# Patient Record
Sex: MALE | Race: OTHER | Employment: OTHER | URBAN - NONMETROPOLITAN AREA
[De-identification: names, ages, dates, MRNs, and addresses within clinical notes are randomized per-mention and may not be internally consistent; named-entity substitution may affect disease eponyms.]

---

## 2017-07-18 RX ORDER — LISINOPRIL AND HYDROCHLOROTHIAZIDE 25; 20 MG/1; MG/1
1 TABLET ORAL DAILY
COMMUNITY

## 2017-07-18 RX ORDER — ASENAPINE 5 MG/1
5 TABLET SUBLINGUAL 3 TIMES DAILY
COMMUNITY

## 2017-07-26 ENCOUNTER — OFFICE VISIT (OUTPATIENT)
Dept: PAIN MANAGEMENT | Age: 57
End: 2017-07-26
Payer: MEDICARE

## 2017-07-26 VITALS
HEIGHT: 69 IN | BODY MASS INDEX: 24.2 KG/M2 | HEART RATE: 68 BPM | RESPIRATION RATE: 14 BRPM | SYSTOLIC BLOOD PRESSURE: 118 MMHG | WEIGHT: 163.4 LBS | DIASTOLIC BLOOD PRESSURE: 70 MMHG

## 2017-07-26 DIAGNOSIS — M54.16 LUMBAR RADICULITIS: Primary | ICD-10-CM

## 2017-07-26 DIAGNOSIS — M54.41 ACUTE BILATERAL LOW BACK PAIN WITH RIGHT-SIDED SCIATICA: ICD-10-CM

## 2017-07-26 PROCEDURE — 99204 OFFICE O/P NEW MOD 45 MIN: CPT | Performed by: PHYSICAL MEDICINE & REHABILITATION

## 2017-07-26 RX ORDER — CYCLOBENZAPRINE HCL 10 MG
10 TABLET ORAL 3 TIMES DAILY PRN
COMMUNITY
End: 2018-02-07 | Stop reason: SDUPTHER

## 2017-07-26 RX ORDER — ASENAPINE 10 MG/1
10 TABLET SUBLINGUAL 2 TIMES DAILY
Status: ON HOLD | COMMUNITY
End: 2020-02-26

## 2017-07-26 ASSESSMENT — ENCOUNTER SYMPTOMS
RESPIRATORY NEGATIVE: 1
BACK PAIN: 1
NAUSEA: 0
ALLERGIC/IMMUNOLOGIC NEGATIVE: 1
EYES NEGATIVE: 1
CONSTIPATION: 0

## 2017-08-07 ENCOUNTER — TELEPHONE (OUTPATIENT)
Dept: PAIN MANAGEMENT | Age: 57
End: 2017-08-07

## 2017-09-06 ENCOUNTER — OFFICE VISIT (OUTPATIENT)
Dept: PAIN MANAGEMENT | Age: 57
End: 2017-09-06
Payer: MEDICARE

## 2017-09-06 VITALS
DIASTOLIC BLOOD PRESSURE: 84 MMHG | SYSTOLIC BLOOD PRESSURE: 135 MMHG | BODY MASS INDEX: 24.44 KG/M2 | HEIGHT: 69 IN | RESPIRATION RATE: 15 BRPM | WEIGHT: 165 LBS | HEART RATE: 65 BPM

## 2017-09-06 DIAGNOSIS — M54.16 LUMBAR RADICULITIS: Primary | ICD-10-CM

## 2017-09-06 DIAGNOSIS — M54.41 ACUTE BILATERAL LOW BACK PAIN WITH RIGHT-SIDED SCIATICA: ICD-10-CM

## 2017-09-06 LAB
AMPHETAMINE SCREEN, URINE: NEGATIVE
BARBITURATE SCREEN, URINE: NEGATIVE
BENZODIAZEPINE SCREEN, URINE: NEGATIVE
COCAINE METABOLITE SCREEN URINE: NEGATIVE
MDMA URINE: NORMAL
METHADONE SCREEN, URINE: NEGATIVE
METHAMPHETAMINE, URINE: NEGATIVE
OPIATE SCREEN URINE: NEGATIVE
OXYCODONE SCREEN URINE: NEGATIVE
PHENCYCLIDINE SCREEN URINE: NEGATIVE
PROPOXYPHENE SCREEN, URINE: NORMAL
THC: NEGATIVE
TRICYCLIC ANTIDEPRESSANTS, UR: NEGATIVE

## 2017-09-06 PROCEDURE — 80305 DRUG TEST PRSMV DIR OPT OBS: CPT | Performed by: PHYSICAL MEDICINE & REHABILITATION

## 2017-09-06 PROCEDURE — 99215 OFFICE O/P EST HI 40 MIN: CPT | Performed by: PHYSICAL MEDICINE & REHABILITATION

## 2017-09-06 ASSESSMENT — ENCOUNTER SYMPTOMS
ALLERGIC/IMMUNOLOGIC NEGATIVE: 1
CONSTIPATION: 0
NAUSEA: 0
EYES NEGATIVE: 1
RESPIRATORY NEGATIVE: 1
BACK PAIN: 1

## 2018-01-17 ENCOUNTER — PROCEDURE VISIT (OUTPATIENT)
Dept: PAIN MANAGEMENT | Age: 58
End: 2018-01-17
Payer: MEDICARE

## 2018-01-17 DIAGNOSIS — G89.29 CHRONIC BILATERAL LOW BACK PAIN WITH BILATERAL SCIATICA: ICD-10-CM

## 2018-01-17 DIAGNOSIS — M54.41 CHRONIC BILATERAL LOW BACK PAIN WITH BILATERAL SCIATICA: ICD-10-CM

## 2018-01-17 DIAGNOSIS — M54.42 CHRONIC BILATERAL LOW BACK PAIN WITH BILATERAL SCIATICA: ICD-10-CM

## 2018-01-17 DIAGNOSIS — M54.16 LUMBAR RADICULOPATHY: Primary | ICD-10-CM

## 2018-01-17 PROCEDURE — 64483 NJX AA&/STRD TFRM EPI L/S 1: CPT | Performed by: PHYSICAL MEDICINE & REHABILITATION

## 2018-01-17 NOTE — PROGRESS NOTES
TRANSFORAMINAL EPIDURAL STEROID INJECTION    1/17/18    Surgeon: Claudio West MD    Pre-operative Diagnosis: There is no problem list on file for this patient. Post-operative Diagnosis: Same    Assistants: none    This is a 61 y.o. male patient with pain in the Back, left leg, right leg.  Previous treatment and examination findings are noted in the H&P. BL5 transforaminal epidural injection has been requested for diagnostic and therapeutic reasons. Conservative treatment was ineffective i.e.: ice, NSAIDS, rest, narcotic medication, chiropractic care, physical therapy and message therapy. Patient is unable to perform the following ADL's: ambulating and grooming                         Last Plain films: 9/6/17      EXAMINATION:  1. BL5 transforaminal radiculogram/epidurogram.   2. BL5 transforaminal epidural anesthetic injection. 3. BL5 transforaminal epidural steroid injection. CONSENT: Written consent was obtained from the patient on preprinted consent form after explaining the procedure, indications, potential complications and outcomes. Alternative treatments were also discussed. DISCUSSION: The patient was sterilely prepped and draped in the usual fashion in the prone position. Time out was verified for correct patient, side, level and procedure. SEDATION:   No conscious sedation was performed during the procedure.  The patient remained awake and conversed throughout the procedure.  The patient underwent pulse oximetry and blood pressure monitoring independently by a trained observer, as well as by a physician. PROCEDURE:  Under image-intensifier control, a 22 gauge needle x 5 inch spinal needle was guided successfully into the epidural space employing a posterior lateral/oblique approach. Needle aspiration was negative for heme or CSF. Instillation of  .5 mL of Omnipaque 240 contrast medium opacified the spinal nerve and demonstrated contiguous flow into the epidural space.  No vascular spread was noted. Digital subtraction was not employed to evaluate for vascular spread. The patient was monitored for any untoward reaction to contrast medium before proceeding with procedure #2. The patient did not report pain reproduction in a concordant distribution. Following needle position verification, a test dose of .5 mL of sterile saline was administered and patient monitored for any adverse effects. Then, 1 mL of Betamethasone (Celestone 6 mg/mL) and Methylprednisolone (Depo-Medrol 80 mg/mL)   was instilled into the epidural space and the patient's response was again monitored. Finally,  1 ml of 0.5% bupivacaine was then instilled. The patient's response was again monitored. The spinal needle was removed. The patient tolerated the procedure well and without complications and was noted to be in stable condition prior to discharge from the procedure center with discharge instructions. EBL: no blood loss    SPECIMEN: none    IMPRESSIONS:  1. BL5 transforaminal epidurogram, epidural anesthesia and epidural steroid injection procedures accomplished without incident. RECOMMENDATIONS:  1. Complete and return Post-Procedure Pain and Activity Diary.   2. Contact the [address] for symptom exacerbation, fever or unusual symptoms. 3. Post-procedure care according to verbal and written discharge instructions    POST-PROCEDURE EPIDUROGRAPHY INTERPRETATION:    EXAMINATION: AP, lateral, and oblique views    FLUORO TIME: 25 seconds    DISCUSSION: Spot views of the spine reveal normal alignment and segmentation. Spinal needle is positioned at the BL5 neuroforamin. Contrast spreads and outlines the BL5 nerve/ neuroforamin and epidural space. The epidurogram reveals excellent contrast flow. Visualized spine reveals See radiology report. Soft tissues reveal no abnormalities.     IMPRESSION: BL5 transforaminal epidurogram/epineurogram reveals satisfactory needle position and contrast spread.      Electronically signed by Nahed Skaggs MD on 1/17/2018 at 1:27 PM

## 2018-02-07 ENCOUNTER — OFFICE VISIT (OUTPATIENT)
Dept: PAIN MANAGEMENT | Age: 58
End: 2018-02-07
Payer: MEDICARE

## 2018-02-07 VITALS
SYSTOLIC BLOOD PRESSURE: 154 MMHG | DIASTOLIC BLOOD PRESSURE: 93 MMHG | HEIGHT: 69 IN | WEIGHT: 175 LBS | BODY MASS INDEX: 25.92 KG/M2 | HEART RATE: 76 BPM

## 2018-02-07 DIAGNOSIS — M54.16 LUMBAR RADICULOPATHY: Primary | ICD-10-CM

## 2018-02-07 DIAGNOSIS — M47.816 LUMBAR FACET JOINT SYNDROME: ICD-10-CM

## 2018-02-07 PROCEDURE — G8419 CALC BMI OUT NRM PARAM NOF/U: HCPCS | Performed by: PHYSICAL MEDICINE & REHABILITATION

## 2018-02-07 PROCEDURE — 3017F COLORECTAL CA SCREEN DOC REV: CPT | Performed by: PHYSICAL MEDICINE & REHABILITATION

## 2018-02-07 PROCEDURE — 4004F PT TOBACCO SCREEN RCVD TLK: CPT | Performed by: PHYSICAL MEDICINE & REHABILITATION

## 2018-02-07 PROCEDURE — 99214 OFFICE O/P EST MOD 30 MIN: CPT | Performed by: PHYSICAL MEDICINE & REHABILITATION

## 2018-02-07 PROCEDURE — G8427 DOCREV CUR MEDS BY ELIG CLIN: HCPCS | Performed by: PHYSICAL MEDICINE & REHABILITATION

## 2018-02-07 PROCEDURE — G8484 FLU IMMUNIZE NO ADMIN: HCPCS | Performed by: PHYSICAL MEDICINE & REHABILITATION

## 2018-02-07 RX ORDER — CYCLOBENZAPRINE HCL 10 MG
10 TABLET ORAL 3 TIMES DAILY PRN
Qty: 90 TABLET | Refills: 1 | Status: SHIPPED | OUTPATIENT
Start: 2018-02-07 | End: 2018-03-09

## 2018-02-07 NOTE — PROGRESS NOTES
PAIN MANAGEMENT FOLLOW-UP NOTE  2/7/18    CHIEF COMPLAINT: This is a 61 y.o. male patient who returns to the [facility] Management [facility] with a history of Back Pain (follow up post injection)      PAIN HPI: Bayron Huber returns today for  reevaluation. Since the visit, the patient reports that the pain is not changed. For about 2 days had  Less back pain post TFE  Now still  Is more painful than  Legs, legs are entirely sharp pain and numbness  ANALGESIA:   Are your Current Pain medication (s) helping to decrease pain? Yes. Current Pain score: Pain Score:   7    ADVERSE AFFECTS:   Medication Side Effects: No.    ACTIVITY:  Are you able to be more active with your pain medications? No      ABERRANT BEHAVIORS SINCE LAST VISIT? No    Review of Systems     Allergies   Allergen Reactions    Asa [Aspirin] Itching and Swelling    Codeine Itching and Swelling       Outpatient Medications Prior to Visit   Medication Sig Dispense Refill    asenapine maleate (SAPHRIS) 10 MG SUBL sublingual tablet Place 10 mg under the tongue 2 times daily      lisinopril-hydrochlorothiazide (PRINZIDE;ZESTORETIC) 20-25 MG per tablet Take 1 tablet by mouth daily      asenapine maleate (SAPHRIS) 5 MG SUBL sublingual tablet Place 5 mg under the tongue daily      Mirtazapine (REMERON PO) Take 45 mg by mouth nightly       cyclobenzaprine (FLEXERIL) 10 MG tablet Take 10 mg by mouth 3 times daily as needed for Muscle spasms      ARIPiprazole (ABILIFY) 15 MG tablet Take 7.5 mg by mouth daily. No facility-administered medications prior to visit. Past Medical History:   Diagnosis Date    Hypertension     Schizoaffective disorder, bipolar type [facility])        Past Surgical History:   Procedure Laterality Date    EYE SURGERY Right 2/27/2014    VITRECTOMY Right 03/03/2014    with antibiotic injection     History reviewed. No pertinent family history. Social History     Social History    Marital status:       Spouse name: N/A    Number of children: N/A    Years of education: N/A     Social History Main Topics    Smoking status: Current Every Day Smoker     Packs/day: 1.00     Years: 40.00    Smokeless tobacco: None    Alcohol use No    Drug use: No    Sexual activity: Not Asked     Other Topics Concern    None     Social History Narrative    None         Family and Social History reviewed in the electronic medical record. Imaging: Reviewed available imaging in our system with the patient. No results found. Objective:     Physical Exam:  Vitals:    02/07/18 1225   BP: (!) 154/93   Site: Right Arm   Position: Sitting   Cuff Size: Medium Adult   Pulse: 76   Weight: 175 lb (79.4 kg)   Height: 5' 9\" (1.753 m)     Pain Score:   7    Physical Exam  Back Exam     Range of Motion   Flexion:                 Extension:                 Lateral Bend Left:    Abnormal  Lateral Bend Right:  Abnormal  Rotation Right:          Rotation Left:            SLR   Right:  Left:    Positive    Comments:  +Lisy B +Princess                                     Assessment: This is a 61 y.o. male patient with:    Diagnosis:   1. Lumbar radiculopathy     2. Lumbar facet joint syndrome         Medical Comorbidities:  As listed in the patient's past medical and surgical history    Functional Limitations:   Pain limits function and quality of life. Plan:   #2  Of B L5 TFE  Meds:   Controlled Substances Monitoring: Pt educated about the risks of taking opiates, including increased sedation, constipation, slowed breathing, tolerance, dependence, and addiction. New Prescriptions    No medications on file      No orders of the defined types were placed in this encounter. No orders of the defined types were placed in this encounter. No Follow-up on file. The patient expressed understanding of the above assessment and plan.     Total time spent face to face with patient was 20 minutes in which  50% or more of the time was spent in

## 2018-02-27 PROBLEM — G89.29 CHRONIC BILATERAL LOW BACK PAIN WITH BILATERAL SCIATICA: Status: ACTIVE | Noted: 2018-02-27

## 2018-02-27 PROBLEM — M54.41 CHRONIC BILATERAL LOW BACK PAIN WITH BILATERAL SCIATICA: Status: ACTIVE | Noted: 2018-02-27

## 2018-02-27 PROBLEM — M54.42 CHRONIC BILATERAL LOW BACK PAIN WITH BILATERAL SCIATICA: Status: ACTIVE | Noted: 2018-02-27

## 2018-02-27 PROBLEM — M54.16 LUMBAR RADICULOPATHY: Status: ACTIVE | Noted: 2018-02-27

## 2018-02-27 NOTE — PROGRESS NOTES
Progress Notes  Encounter Date: 1/17/2018  Sp Ramirez MD   Physical Medicine and Rehab      []Hide copied text  []Hover for attribution information  TRANSFORAMINAL EPIDURAL STEROID INJECTION     1/17/18     Surgeon: Sp Ramirez MD     Pre-operative Diagnosis: lumbar radiculopathy                                            Chronic lower back pain        Post-operative Diagnosis: Same     Assistants: none     This is a 61 y.o. male patient with pain in the Back, left leg, right leg.  Previous treatment and examination findings are noted in the H&P. BL5 transforaminal epidural injection has been requested for diagnostic and therapeutic reasons.     Conservative treatment was ineffective i.e.: ice, NSAIDS, rest, narcotic medication, chiropractic care, physical therapy and message therapy.     Patient is unable to perform the following ADL's: ambulating and grooming        Last Plain films: 9/6/17        EXAMINATION:  1. BL5 transforaminal radiculogram/epidurogram.   2. BL5 transforaminal epidural anesthetic injection. 3. BL5 transforaminal epidural steroid injection.     CONSENT: Written consent was obtained from the patient on preprinted consent form after explaining the procedure, indications, potential complications and outcomes. Alternative treatments were also discussed.     DISCUSSION: The patient was sterilely prepped and draped in the usual fashion in the prone position. Time out was verified for correct patient, side, level and procedure.     SEDATION:   No conscious sedation was performed during the procedure.  The patient remained awake and conversed throughout the procedure.  The patient underwent pulse oximetry and blood pressure monitoring independently by a trained observer, as well as by a physician.     PROCEDURE:  Under image-intensifier control, a 22 gauge needle x 5 inch spinal needle was guided successfully into the epidural space employing a posterior lateral/oblique approach.  Needle

## 2018-05-01 RX ORDER — CYCLOBENZAPRINE HCL 10 MG
10 TABLET ORAL 3 TIMES DAILY
Qty: 90 TABLET | Refills: 0 | Status: SHIPPED | OUTPATIENT
Start: 2018-05-01 | End: 2018-05-31

## 2018-05-01 RX ORDER — CYCLOBENZAPRINE HCL 10 MG
10 TABLET ORAL 3 TIMES DAILY PRN
COMMUNITY
Start: 2018-04-11 | End: 2018-05-01 | Stop reason: SDUPTHER

## 2018-05-02 ENCOUNTER — OFFICE VISIT (OUTPATIENT)
Dept: PAIN MANAGEMENT | Age: 58
End: 2018-05-02
Payer: MEDICARE

## 2018-05-02 VITALS
HEART RATE: 82 BPM | DIASTOLIC BLOOD PRESSURE: 82 MMHG | SYSTOLIC BLOOD PRESSURE: 120 MMHG | WEIGHT: 175.04 LBS | OXYGEN SATURATION: 95 % | HEIGHT: 69 IN | BODY MASS INDEX: 25.93 KG/M2 | RESPIRATION RATE: 16 BRPM

## 2018-05-02 DIAGNOSIS — M54.41 CHRONIC BILATERAL LOW BACK PAIN WITH BILATERAL SCIATICA: ICD-10-CM

## 2018-05-02 DIAGNOSIS — G89.29 CHRONIC BILATERAL LOW BACK PAIN WITH BILATERAL SCIATICA: ICD-10-CM

## 2018-05-02 DIAGNOSIS — M54.42 CHRONIC BILATERAL LOW BACK PAIN WITH BILATERAL SCIATICA: ICD-10-CM

## 2018-05-02 DIAGNOSIS — M54.16 LUMBAR RADICULOPATHY: Primary | ICD-10-CM

## 2018-05-02 DIAGNOSIS — M12.811 ROTATOR CUFF ARTHROPATHY, RIGHT: ICD-10-CM

## 2018-05-02 DIAGNOSIS — M54.2 CERVICALGIA: ICD-10-CM

## 2018-05-02 PROCEDURE — G8427 DOCREV CUR MEDS BY ELIG CLIN: HCPCS | Performed by: PHYSICAL MEDICINE & REHABILITATION

## 2018-05-02 PROCEDURE — 3017F COLORECTAL CA SCREEN DOC REV: CPT | Performed by: PHYSICAL MEDICINE & REHABILITATION

## 2018-05-02 PROCEDURE — 4004F PT TOBACCO SCREEN RCVD TLK: CPT | Performed by: PHYSICAL MEDICINE & REHABILITATION

## 2018-05-02 PROCEDURE — G8419 CALC BMI OUT NRM PARAM NOF/U: HCPCS | Performed by: PHYSICAL MEDICINE & REHABILITATION

## 2018-05-02 PROCEDURE — 99214 OFFICE O/P EST MOD 30 MIN: CPT | Performed by: PHYSICAL MEDICINE & REHABILITATION

## 2018-05-02 RX ORDER — ATORVASTATIN CALCIUM 20 MG/1
20 TABLET, FILM COATED ORAL DAILY
COMMUNITY

## 2018-06-13 ENCOUNTER — OFFICE VISIT (OUTPATIENT)
Dept: PAIN MANAGEMENT | Age: 58
End: 2018-06-13
Payer: MEDICARE

## 2018-06-13 VITALS
DIASTOLIC BLOOD PRESSURE: 78 MMHG | RESPIRATION RATE: 16 BRPM | OXYGEN SATURATION: 98 % | WEIGHT: 175.04 LBS | BODY MASS INDEX: 24.51 KG/M2 | SYSTOLIC BLOOD PRESSURE: 122 MMHG | HEIGHT: 71 IN | HEART RATE: 69 BPM

## 2018-06-13 DIAGNOSIS — G89.29 CHRONIC BILATERAL LOW BACK PAIN WITH BILATERAL SCIATICA: Primary | ICD-10-CM

## 2018-06-13 DIAGNOSIS — M54.16 LUMBAR RADICULOPATHY: ICD-10-CM

## 2018-06-13 DIAGNOSIS — M54.42 CHRONIC BILATERAL LOW BACK PAIN WITH BILATERAL SCIATICA: Primary | ICD-10-CM

## 2018-06-13 DIAGNOSIS — M54.41 CHRONIC BILATERAL LOW BACK PAIN WITH BILATERAL SCIATICA: Primary | ICD-10-CM

## 2018-06-13 DIAGNOSIS — M47.816 LUMBAR FACET ARTHROPATHY: ICD-10-CM

## 2018-06-13 PROCEDURE — 4004F PT TOBACCO SCREEN RCVD TLK: CPT | Performed by: PHYSICAL MEDICINE & REHABILITATION

## 2018-06-13 PROCEDURE — G8420 CALC BMI NORM PARAMETERS: HCPCS | Performed by: PHYSICAL MEDICINE & REHABILITATION

## 2018-06-13 PROCEDURE — 99214 OFFICE O/P EST MOD 30 MIN: CPT | Performed by: PHYSICAL MEDICINE & REHABILITATION

## 2018-06-13 PROCEDURE — 3017F COLORECTAL CA SCREEN DOC REV: CPT | Performed by: PHYSICAL MEDICINE & REHABILITATION

## 2018-06-13 PROCEDURE — G8427 DOCREV CUR MEDS BY ELIG CLIN: HCPCS | Performed by: PHYSICAL MEDICINE & REHABILITATION

## 2018-06-26 RX ORDER — CYCLOBENZAPRINE HCL 10 MG
TABLET ORAL
Qty: 90 TABLET | Refills: 0 | Status: SHIPPED | OUTPATIENT
Start: 2018-06-26 | End: 2018-06-27 | Stop reason: SDUPTHER

## 2018-06-29 RX ORDER — CYCLOBENZAPRINE HCL 10 MG
TABLET ORAL
Qty: 90 TABLET | Refills: 0 | Status: SHIPPED | OUTPATIENT
Start: 2018-06-29 | End: 2018-08-01 | Stop reason: SDUPTHER

## 2018-08-01 RX ORDER — CYCLOBENZAPRINE HCL 10 MG
TABLET ORAL
Qty: 90 TABLET | Refills: 0 | Status: CANCELLED | OUTPATIENT
Start: 2018-08-01

## 2018-08-01 RX ORDER — CYCLOBENZAPRINE HCL 10 MG
TABLET ORAL
Qty: 90 TABLET | Refills: 0 | Status: SHIPPED | OUTPATIENT
Start: 2018-08-01 | End: 2018-08-20 | Stop reason: SDUPTHER

## 2018-08-24 RX ORDER — CYCLOBENZAPRINE HCL 10 MG
10 TABLET ORAL 3 TIMES DAILY PRN
Qty: 90 TABLET | Refills: 11 | Status: ON HOLD | OUTPATIENT
Start: 2018-08-24 | End: 2020-02-26

## 2019-09-24 ENCOUNTER — HOSPITAL ENCOUNTER (OUTPATIENT)
Age: 59
Setting detail: SPECIMEN
Discharge: HOME | End: 2019-09-24
Payer: MEDICARE

## 2019-09-25 LAB
C. TRACHOMATIS DNA ,URINE: NEGATIVE
N. GONORRHOEAE DNA, URINE: NEGATIVE
SOURCE: NORMAL
SPECIMEN DESCRIPTION: NORMAL
TRICHOMONAS VAGINALI, MOLECULAR: NEGATIVE

## 2019-09-26 LAB
CULTURE: NORMAL
Lab: NORMAL
SPECIMEN DESCRIPTION: NORMAL

## 2020-02-26 ENCOUNTER — HOSPITAL ENCOUNTER (INPATIENT)
Age: 60
LOS: 4 days | Discharge: HOME | DRG: 641 | End: 2020-03-01
Attending: FAMILY MEDICINE | Admitting: INTERNAL MEDICINE
Payer: MEDICARE

## 2020-02-26 PROBLEM — R21 GENERALIZED RASH: Status: ACTIVE | Noted: 2020-02-26

## 2020-02-26 PROCEDURE — 99222 1ST HOSP IP/OBS MODERATE 55: CPT | Performed by: NURSE PRACTITIONER

## 2020-02-26 PROCEDURE — 6370000000 HC RX 637 (ALT 250 FOR IP): Performed by: PHYSICIAN ASSISTANT

## 2020-02-26 PROCEDURE — 2580000003 HC RX 258: Performed by: PHYSICIAN ASSISTANT

## 2020-02-26 PROCEDURE — 6370000000 HC RX 637 (ALT 250 FOR IP): Performed by: NURSE PRACTITIONER

## 2020-02-26 PROCEDURE — 2060000000 HC ICU INTERMEDIATE R&B

## 2020-02-26 RX ORDER — ACETAMINOPHEN 325 MG/1
650 TABLET ORAL EVERY 6 HOURS PRN
Status: DISCONTINUED | OUTPATIENT
Start: 2020-02-26 | End: 2020-03-01 | Stop reason: HOSPADM

## 2020-02-26 RX ORDER — DIPHENHYDRAMINE HCL 25 MG
25 TABLET ORAL EVERY 6 HOURS PRN
Status: DISCONTINUED | OUTPATIENT
Start: 2020-02-26 | End: 2020-03-01 | Stop reason: HOSPADM

## 2020-02-26 RX ORDER — SODIUM CHLORIDE 0.9 % (FLUSH) 0.9 %
10 SYRINGE (ML) INJECTION PRN
Status: DISCONTINUED | OUTPATIENT
Start: 2020-02-26 | End: 2020-03-01 | Stop reason: HOSPADM

## 2020-02-26 RX ORDER — ASENAPINE 5 MG/1
5 TABLET SUBLINGUAL 2 TIMES DAILY
Status: DISCONTINUED | OUTPATIENT
Start: 2020-02-26 | End: 2020-03-01 | Stop reason: HOSPADM

## 2020-02-26 RX ORDER — LISINOPRIL AND HYDROCHLOROTHIAZIDE 25; 20 MG/1; MG/1
1 TABLET ORAL DAILY
Status: DISCONTINUED | OUTPATIENT
Start: 2020-02-27 | End: 2020-03-01 | Stop reason: HOSPADM

## 2020-02-26 RX ORDER — POLYETHYLENE GLYCOL 3350 17 G/17G
17 POWDER, FOR SOLUTION ORAL DAILY PRN
Status: DISCONTINUED | OUTPATIENT
Start: 2020-02-26 | End: 2020-03-01 | Stop reason: HOSPADM

## 2020-02-26 RX ORDER — NICOTINE 21 MG/24HR
1 PATCH, TRANSDERMAL 24 HOURS TRANSDERMAL DAILY PRN
Status: DISCONTINUED | OUTPATIENT
Start: 2020-02-26 | End: 2020-03-01 | Stop reason: HOSPADM

## 2020-02-26 RX ORDER — ASENAPINE 5 MG/1
10 TABLET SUBLINGUAL 2 TIMES DAILY
Status: DISCONTINUED | OUTPATIENT
Start: 2020-02-26 | End: 2020-02-26

## 2020-02-26 RX ORDER — ONDANSETRON 2 MG/ML
4 INJECTION INTRAMUSCULAR; INTRAVENOUS EVERY 6 HOURS PRN
Status: DISCONTINUED | OUTPATIENT
Start: 2020-02-26 | End: 2020-03-01 | Stop reason: HOSPADM

## 2020-02-26 RX ORDER — SODIUM CHLORIDE 9 MG/ML
INJECTION, SOLUTION INTRAVENOUS CONTINUOUS
Status: DISCONTINUED | OUTPATIENT
Start: 2020-02-26 | End: 2020-03-01 | Stop reason: HOSPADM

## 2020-02-26 RX ORDER — SODIUM CHLORIDE 0.9 % (FLUSH) 0.9 %
10 SYRINGE (ML) INJECTION EVERY 12 HOURS SCHEDULED
Status: DISCONTINUED | OUTPATIENT
Start: 2020-02-26 | End: 2020-03-01 | Stop reason: HOSPADM

## 2020-02-26 RX ORDER — ATORVASTATIN CALCIUM 20 MG/1
20 TABLET, FILM COATED ORAL DAILY
Status: DISCONTINUED | OUTPATIENT
Start: 2020-02-27 | End: 2020-03-01 | Stop reason: HOSPADM

## 2020-02-26 RX ORDER — ARIPIPRAZOLE 15 MG/1
7.5 TABLET ORAL DAILY
Status: DISCONTINUED | OUTPATIENT
Start: 2020-02-27 | End: 2020-02-27

## 2020-02-26 RX ORDER — MAGNESIUM SULFATE 1 G/100ML
1 INJECTION INTRAVENOUS PRN
Status: DISCONTINUED | OUTPATIENT
Start: 2020-02-26 | End: 2020-03-01 | Stop reason: HOSPADM

## 2020-02-26 RX ORDER — METHYLPREDNISOLONE SODIUM SUCCINATE 125 MG/2ML
60 INJECTION, POWDER, LYOPHILIZED, FOR SOLUTION INTRAMUSCULAR; INTRAVENOUS DAILY
Status: DISCONTINUED | OUTPATIENT
Start: 2020-02-27 | End: 2020-02-29

## 2020-02-26 RX ORDER — POTASSIUM CHLORIDE 7.45 MG/ML
10 INJECTION INTRAVENOUS PRN
Status: DISCONTINUED | OUTPATIENT
Start: 2020-02-26 | End: 2020-03-01 | Stop reason: HOSPADM

## 2020-02-26 RX ORDER — PROMETHAZINE HYDROCHLORIDE 25 MG/1
12.5 TABLET ORAL EVERY 6 HOURS PRN
Status: DISCONTINUED | OUTPATIENT
Start: 2020-02-26 | End: 2020-03-01 | Stop reason: HOSPADM

## 2020-02-26 RX ORDER — CYCLOBENZAPRINE HCL 10 MG
10 TABLET ORAL 3 TIMES DAILY PRN
Status: DISCONTINUED | OUTPATIENT
Start: 2020-02-26 | End: 2020-02-27

## 2020-02-26 RX ORDER — ACETAMINOPHEN 650 MG/1
650 SUPPOSITORY RECTAL EVERY 6 HOURS PRN
Status: DISCONTINUED | OUTPATIENT
Start: 2020-02-26 | End: 2020-03-01 | Stop reason: HOSPADM

## 2020-02-26 RX ORDER — POTASSIUM CHLORIDE 20 MEQ/1
40 TABLET, EXTENDED RELEASE ORAL PRN
Status: DISCONTINUED | OUTPATIENT
Start: 2020-02-26 | End: 2020-03-01 | Stop reason: HOSPADM

## 2020-02-26 RX ADMIN — MIRTAZAPINE 45 MG: 15 TABLET, FILM COATED ORAL at 22:45

## 2020-02-26 RX ADMIN — ASENAPINE MALEATE 5 MG: 5 TABLET SUBLINGUAL at 22:45

## 2020-02-26 RX ADMIN — SODIUM CHLORIDE, PRESERVATIVE FREE 10 ML: 5 INJECTION INTRAVENOUS at 22:17

## 2020-02-26 RX ADMIN — SODIUM CHLORIDE: 9 INJECTION, SOLUTION INTRAVENOUS at 23:18

## 2020-02-26 ASSESSMENT — PAIN DESCRIPTION - ORIENTATION: ORIENTATION: MID;RIGHT;LEFT

## 2020-02-26 ASSESSMENT — PAIN DESCRIPTION - ONSET: ONSET: ON-GOING

## 2020-02-26 ASSESSMENT — PAIN DESCRIPTION - PROGRESSION: CLINICAL_PROGRESSION: NOT CHANGED

## 2020-02-26 ASSESSMENT — PAIN DESCRIPTION - FREQUENCY: FREQUENCY: CONTINUOUS

## 2020-02-26 ASSESSMENT — PAIN - FUNCTIONAL ASSESSMENT: PAIN_FUNCTIONAL_ASSESSMENT: PREVENTS OR INTERFERES SOME ACTIVE ACTIVITIES AND ADLS

## 2020-02-26 ASSESSMENT — PAIN DESCRIPTION - DESCRIPTORS: DESCRIPTORS: ACHING;BURNING;CONSTANT

## 2020-02-26 ASSESSMENT — PAIN SCALES - GENERAL: PAINLEVEL_OUTOF10: 6

## 2020-02-26 ASSESSMENT — PAIN DESCRIPTION - LOCATION: LOCATION: ARM;BACK

## 2020-02-26 ASSESSMENT — PAIN DESCRIPTION - PAIN TYPE: TYPE: ACUTE PAIN;CHRONIC PAIN

## 2020-02-26 ASSESSMENT — PAIN DESCRIPTION - DIRECTION: RADIATING_TOWARDS: LEGS

## 2020-02-27 PROBLEM — E52: Status: ACTIVE | Noted: 2020-02-27

## 2020-02-27 PROBLEM — L51.3 SJS-TEN OVERLAP SYNDROME (HCC): Status: ACTIVE | Noted: 2020-02-26

## 2020-02-27 PROBLEM — L56.8 PHOTOSENSITIVITY DERMATITIS: Status: ACTIVE | Noted: 2020-02-27

## 2020-02-27 LAB
ABSOLUTE EOS #: 0 K/UL (ref 0–0.4)
ABSOLUTE IMMATURE GRANULOCYTE: 0 K/UL (ref 0–0.3)
ABSOLUTE LYMPH #: 0.31 K/UL (ref 1–4.8)
ABSOLUTE MONO #: 0.18 K/UL (ref 0.1–0.8)
ALBUMIN SERPL-MCNC: 4 G/DL (ref 3.5–5.2)
ALBUMIN/GLOBULIN RATIO: 1.4 (ref 1–2.5)
ALP BLD-CCNC: 91 U/L (ref 40–129)
ALT SERPL-CCNC: 16 U/L (ref 5–41)
ANION GAP SERPL CALCULATED.3IONS-SCNC: 14 MMOL/L (ref 9–17)
ANION GAP SERPL CALCULATED.3IONS-SCNC: 14 MMOL/L (ref 9–17)
ANTISTREPTOLYSIN-O: 52.5 IU/ML (ref 0–200)
AST SERPL-CCNC: 12 U/L
BASOPHILS # BLD: 0 % (ref 0–2)
BASOPHILS ABSOLUTE: 0 K/UL (ref 0–0.2)
BILIRUB SERPL-MCNC: 0.3 MG/DL (ref 0.3–1.2)
BILIRUBIN DIRECT: 0.1 MG/DL
BILIRUBIN URINE: NEGATIVE
BILIRUBIN, INDIRECT: 0.2 MG/DL (ref 0–1)
BUN BLDV-MCNC: 20 MG/DL (ref 8–23)
BUN BLDV-MCNC: 20 MG/DL (ref 8–23)
BUN/CREAT BLD: NORMAL (ref 9–20)
CALCIUM SERPL-MCNC: 8.9 MG/DL (ref 8.6–10.4)
CALCIUM SERPL-MCNC: 9.1 MG/DL (ref 8.6–10.4)
CHLORIDE BLD-SCNC: 102 MMOL/L (ref 98–107)
CHLORIDE BLD-SCNC: 99 MMOL/L (ref 98–107)
CO2: 20 MMOL/L (ref 20–31)
CO2: 22 MMOL/L (ref 20–31)
COLOR: YELLOW
COMMENT UA: ABNORMAL
CREAT SERPL-MCNC: 0.71 MG/DL (ref 0.7–1.2)
CREAT SERPL-MCNC: 0.73 MG/DL (ref 0.7–1.2)
DIFFERENTIAL TYPE: ABNORMAL
EOSINOPHIL,URINE: NORMAL
EOSINOPHILS RELATIVE PERCENT: 0 % (ref 1–4)
FOLATE: 12.6 NG/ML
GFR AFRICAN AMERICAN: >60 ML/MIN
GFR AFRICAN AMERICAN: >60 ML/MIN
GFR NON-AFRICAN AMERICAN: >60 ML/MIN
GFR NON-AFRICAN AMERICAN: >60 ML/MIN
GFR SERPL CREATININE-BSD FRML MDRD: ABNORMAL ML/MIN/{1.73_M2}
GFR SERPL CREATININE-BSD FRML MDRD: ABNORMAL ML/MIN/{1.73_M2}
GFR SERPL CREATININE-BSD FRML MDRD: NORMAL ML/MIN/{1.73_M2}
GFR SERPL CREATININE-BSD FRML MDRD: NORMAL ML/MIN/{1.73_M2}
GLUCOSE BLD-MCNC: 127 MG/DL (ref 70–99)
GLUCOSE BLD-MCNC: 90 MG/DL (ref 70–99)
GLUCOSE URINE: ABNORMAL
HCT VFR BLD CALC: 41 % (ref 40.7–50.3)
HCT VFR BLD CALC: 41.8 % (ref 40.7–50.3)
HEMOGLOBIN: 14 G/DL (ref 13–17)
HEMOGLOBIN: 14.1 G/DL (ref 13–17)
HIV AG/AB: NONREACTIVE
IMMATURE GRANULOCYTES: 0 %
KETONES, URINE: ABNORMAL
LEUKOCYTE ESTERASE, URINE: NEGATIVE
LYMPHOCYTES # BLD: 7 % (ref 24–44)
MCH RBC QN AUTO: 31 PG (ref 25.2–33.5)
MCH RBC QN AUTO: 31.4 PG (ref 25.2–33.5)
MCHC RBC AUTO-ENTMCNC: 33.7 G/DL (ref 28.4–34.8)
MCHC RBC AUTO-ENTMCNC: 34.1 G/DL (ref 28.4–34.8)
MCV RBC AUTO: 90.7 FL (ref 82.6–102.9)
MCV RBC AUTO: 93.1 FL (ref 82.6–102.9)
MONOCYTES # BLD: 4 % (ref 1–7)
MORPHOLOGY: NORMAL
NITRITE, URINE: NEGATIVE
NRBC AUTOMATED: 0 PER 100 WBC
NRBC AUTOMATED: 0 PER 100 WBC
PDW BLD-RTO: 12.8 % (ref 11.8–14.4)
PDW BLD-RTO: 12.9 % (ref 11.8–14.4)
PH UA: 6.5 (ref 5–8)
PLATELET # BLD: 225 K/UL (ref 138–453)
PLATELET # BLD: 243 K/UL (ref 138–453)
PLATELET ESTIMATE: ABNORMAL
PMV BLD AUTO: 9 FL (ref 8.1–13.5)
PMV BLD AUTO: 9.1 FL (ref 8.1–13.5)
POTASSIUM SERPL-SCNC: 4.1 MMOL/L (ref 3.7–5.3)
POTASSIUM SERPL-SCNC: 4.7 MMOL/L (ref 3.7–5.3)
PROTEIN UA: NEGATIVE
RBC # BLD: 4.49 M/UL (ref 4.21–5.77)
RBC # BLD: 4.52 M/UL (ref 4.21–5.77)
RBC # BLD: ABNORMAL 10*6/UL
SEDIMENTATION RATE, ERYTHROCYTE: 18 MM (ref 0–10)
SEG NEUTROPHILS: 89 % (ref 36–66)
SEGMENTED NEUTROPHILS ABSOLUTE COUNT: 3.91 K/UL (ref 1.8–7.7)
SODIUM BLD-SCNC: 135 MMOL/L (ref 135–144)
SODIUM BLD-SCNC: 136 MMOL/L (ref 135–144)
SPECIFIC GRAVITY UA: 1.01 (ref 1–1.03)
TOTAL PROTEIN: 6.9 G/DL (ref 6.4–8.3)
TURBIDITY: CLEAR
URINE HGB: NEGATIVE
UROBILINOGEN, URINE: NORMAL
VITAMIN B-12: 719 PG/ML (ref 232–1245)
WBC # BLD: 4.4 K/UL (ref 3.5–11.3)
WBC # BLD: 7.6 K/UL (ref 3.5–11.3)
WBC # BLD: ABNORMAL 10*3/UL

## 2020-02-27 PROCEDURE — 81003 URINALYSIS AUTO W/O SCOPE: CPT

## 2020-02-27 PROCEDURE — 82607 VITAMIN B-12: CPT

## 2020-02-27 PROCEDURE — 6370000000 HC RX 637 (ALT 250 FOR IP): Performed by: INTERNAL MEDICINE

## 2020-02-27 PROCEDURE — 86738 MYCOPLASMA ANTIBODY: CPT

## 2020-02-27 PROCEDURE — 6370000000 HC RX 637 (ALT 250 FOR IP): Performed by: PHYSICIAN ASSISTANT

## 2020-02-27 PROCEDURE — 76937 US GUIDE VASCULAR ACCESS: CPT

## 2020-02-27 PROCEDURE — 2580000003 HC RX 258: Performed by: PHYSICIAN ASSISTANT

## 2020-02-27 PROCEDURE — 82248 BILIRUBIN DIRECT: CPT

## 2020-02-27 PROCEDURE — 82746 ASSAY OF FOLIC ACID SERUM: CPT

## 2020-02-27 PROCEDURE — 6370000000 HC RX 637 (ALT 250 FOR IP): Performed by: NURSE PRACTITIONER

## 2020-02-27 PROCEDURE — 99233 SBSQ HOSP IP/OBS HIGH 50: CPT | Performed by: INTERNAL MEDICINE

## 2020-02-27 PROCEDURE — 87205 SMEAR GRAM STAIN: CPT

## 2020-02-27 PROCEDURE — 80053 COMPREHEN METABOLIC PANEL: CPT

## 2020-02-27 PROCEDURE — 87389 HIV-1 AG W/HIV-1&-2 AB AG IA: CPT

## 2020-02-27 PROCEDURE — 84207 ASSAY OF VITAMIN B-6: CPT

## 2020-02-27 PROCEDURE — 84591 ASSAY OF NOS VITAMIN: CPT

## 2020-02-27 PROCEDURE — 2060000000 HC ICU INTERMEDIATE R&B

## 2020-02-27 PROCEDURE — 6360000002 HC RX W HCPCS: Performed by: PHYSICIAN ASSISTANT

## 2020-02-27 PROCEDURE — 85651 RBC SED RATE NONAUTOMATED: CPT

## 2020-02-27 PROCEDURE — 36415 COLL VENOUS BLD VENIPUNCTURE: CPT

## 2020-02-27 PROCEDURE — 87899 AGENT NOS ASSAY W/OPTIC: CPT

## 2020-02-27 PROCEDURE — 99213 OFFICE O/P EST LOW 20 MIN: CPT

## 2020-02-27 PROCEDURE — 80048 BASIC METABOLIC PNL TOTAL CA: CPT

## 2020-02-27 PROCEDURE — 85027 COMPLETE CBC AUTOMATED: CPT

## 2020-02-27 PROCEDURE — 86215 DEOXYRIBONUCLEASE ANTIBODY: CPT

## 2020-02-27 PROCEDURE — 85025 COMPLETE CBC W/AUTO DIFF WBC: CPT

## 2020-02-27 PROCEDURE — 86063 ANTISTREPTOLYSIN O SCREEN: CPT

## 2020-02-27 RX ORDER — NIACIN 500 MG/1
500 TABLET, EXTENDED RELEASE ORAL NIGHTLY
Status: DISCONTINUED | OUTPATIENT
Start: 2020-02-27 | End: 2020-03-01 | Stop reason: HOSPADM

## 2020-02-27 RX ORDER — GABAPENTIN 100 MG/1
100 CAPSULE ORAL 3 TIMES DAILY
Status: DISCONTINUED | OUTPATIENT
Start: 2020-02-27 | End: 2020-03-01 | Stop reason: HOSPADM

## 2020-02-27 RX ORDER — FOLIC ACID 1 MG/1
1 TABLET ORAL DAILY
Status: DISCONTINUED | OUTPATIENT
Start: 2020-02-27 | End: 2020-03-01 | Stop reason: HOSPADM

## 2020-02-27 RX ORDER — LANOLIN ALCOHOL/MO/W.PET/CERES
500 CREAM (GRAM) TOPICAL NIGHTLY
Status: DISCONTINUED | OUTPATIENT
Start: 2020-02-27 | End: 2020-02-27

## 2020-02-27 RX ORDER — THIAMINE MONONITRATE (VIT B1) 100 MG
100 TABLET ORAL DAILY
Status: DISCONTINUED | OUTPATIENT
Start: 2020-02-27 | End: 2020-03-01 | Stop reason: HOSPADM

## 2020-02-27 RX ORDER — LANOLIN ALCOHOL/MO/W.PET/CERES
50 CREAM (GRAM) TOPICAL DAILY
Status: DISCONTINUED | OUTPATIENT
Start: 2020-02-27 | End: 2020-03-01 | Stop reason: HOSPADM

## 2020-02-27 RX ADMIN — Medication 100 MG: at 15:47

## 2020-02-27 RX ADMIN — SODIUM CHLORIDE, PRESERVATIVE FREE 10 ML: 5 INJECTION INTRAVENOUS at 09:50

## 2020-02-27 RX ADMIN — ATORVASTATIN CALCIUM 20 MG: 20 TABLET, FILM COATED ORAL at 09:36

## 2020-02-27 RX ADMIN — ASENAPINE MALEATE 5 MG: 5 TABLET SUBLINGUAL at 09:36

## 2020-02-27 RX ADMIN — MIRTAZAPINE 45 MG: 15 TABLET, FILM COATED ORAL at 19:37

## 2020-02-27 RX ADMIN — GABAPENTIN 100 MG: 100 CAPSULE ORAL at 15:47

## 2020-02-27 RX ADMIN — SODIUM CHLORIDE, PRESERVATIVE FREE 10 ML: 5 INJECTION INTRAVENOUS at 19:38

## 2020-02-27 RX ADMIN — METHYLPREDNISOLONE SODIUM SUCCINATE 60 MG: 125 INJECTION, POWDER, FOR SOLUTION INTRAMUSCULAR; INTRAVENOUS at 09:37

## 2020-02-27 RX ADMIN — ASENAPINE MALEATE 5 MG: 5 TABLET SUBLINGUAL at 19:37

## 2020-02-27 RX ADMIN — FOLIC ACID 1 MG: 1 TABLET ORAL at 15:47

## 2020-02-27 RX ADMIN — LISINOPRIL AND HYDROCHLOROTHIAZIDE 1 TABLET: 25; 20 TABLET ORAL at 09:36

## 2020-02-27 RX ADMIN — Medication 50 MG: at 15:47

## 2020-02-27 RX ADMIN — ENOXAPARIN SODIUM 40 MG: 40 INJECTION SUBCUTANEOUS at 09:39

## 2020-02-27 RX ADMIN — SODIUM CHLORIDE: 9 INJECTION, SOLUTION INTRAVENOUS at 11:35

## 2020-02-27 RX ADMIN — POLYETHYLENE GLYCOL 3350 17 G: 17 POWDER, FOR SOLUTION ORAL at 19:43

## 2020-02-27 RX ADMIN — DIPHENHYDRAMINE HCL 25 MG: 25 TABLET ORAL at 09:36

## 2020-02-27 RX ADMIN — SODIUM CHLORIDE: 9 INJECTION, SOLUTION INTRAVENOUS at 19:43

## 2020-02-27 RX ADMIN — NIACIN 500 MG: 500 TABLET, EXTENDED RELEASE ORAL at 19:37

## 2020-02-27 RX ADMIN — GABAPENTIN 100 MG: 100 CAPSULE ORAL at 19:37

## 2020-02-27 ASSESSMENT — ENCOUNTER SYMPTOMS
COLOR CHANGE: 1
STRIDOR: 0
ABDOMINAL PAIN: 0
DIARRHEA: 0
EYE PAIN: 0
PHOTOPHOBIA: 0
BLOOD IN STOOL: 0
RHINORRHEA: 1
NAUSEA: 0
CONSTIPATION: 0
VOMITING: 0
COUGH: 0
SHORTNESS OF BREATH: 0
EYE REDNESS: 0
EYE ITCHING: 0
WHEEZING: 0

## 2020-02-27 ASSESSMENT — PAIN DESCRIPTION - ONSET
ONSET: ON-GOING
ONSET: ON-GOING
ONSET: PROGRESSIVE

## 2020-02-27 ASSESSMENT — PAIN SCALES - GENERAL
PAINLEVEL_OUTOF10: 6
PAINLEVEL_OUTOF10: 4

## 2020-02-27 ASSESSMENT — PAIN - FUNCTIONAL ASSESSMENT
PAIN_FUNCTIONAL_ASSESSMENT: PREVENTS OR INTERFERES SOME ACTIVE ACTIVITIES AND ADLS

## 2020-02-27 ASSESSMENT — PAIN DESCRIPTION - DESCRIPTORS
DESCRIPTORS: CRAMPING;BURNING
DESCRIPTORS: BURNING;CRAMPING
DESCRIPTORS: CRAMPING;BURNING

## 2020-02-27 ASSESSMENT — PAIN DESCRIPTION - ORIENTATION
ORIENTATION: RIGHT;LEFT
ORIENTATION: RIGHT;LEFT
ORIENTATION: ANTERIOR;LEFT;RIGHT

## 2020-02-27 ASSESSMENT — PAIN DESCRIPTION - PROGRESSION
CLINICAL_PROGRESSION: NOT CHANGED

## 2020-02-27 ASSESSMENT — PAIN DESCRIPTION - PAIN TYPE
TYPE: ACUTE PAIN

## 2020-02-27 ASSESSMENT — PAIN DESCRIPTION - LOCATION
LOCATION: FOOT;ARM
LOCATION: ARM;FOOT
LOCATION: FOOT

## 2020-02-27 ASSESSMENT — PAIN DESCRIPTION - DIRECTION
RADIATING_TOWARDS: LEGS
RADIATING_TOWARDS: LEGS

## 2020-02-27 ASSESSMENT — PAIN DESCRIPTION - FREQUENCY
FREQUENCY: CONTINUOUS

## 2020-02-27 NOTE — PLAN OF CARE
Problem: Falls - Risk of:  Goal: Will remain free from falls  Description  Will remain free from falls  Outcome: Ongoing   Patient calls out appropriately, bed in low position, non-skid socks in place, room free of clutter. Problem: Skin Integrity:  Goal: Absence of new skin breakdown  Description  Absence of new skin breakdown  Outcome: Ongoing   No new blisters opened during this shift. Nurse will cont. To monitor for remainder of shift.      Electronically signed by Tita Hooks RN on 2/27/2020 at 6:11 AM

## 2020-02-27 NOTE — PROGRESS NOTES
Occupational Therapy Not Seen Note    DATE: 2020  Name: Zofia Dasilva  : 1960  MRN: [id number]    Patient not available for Occupational Therapy due to:    Pt independent with functional mobility and functional tasks. Pt with no OT acute care needs at this time, will defer OT eval. Pt IND for ADL/ functional activities. Pt demo IND in functional mobility, no LOB or SOB noted. Pt declines need for therapy. Pt educated in ability to re-order therapy if functional status changes.     Next Scheduled Treatment: Defer OT evaluation    Electronically signed by PAULINO Cope on 2020 at 10:30 AM

## 2020-02-27 NOTE — PROGRESS NOTES
Smoking Cessation - topics covered   []  Health Risks  []  Benefits of Quitting   []  Smoking Cessation  []  Patient has no history of tobacco use per note in significant history. []  Patient is former smoker per note in significant history. Patient quit in   []  No need for tobacco cessation education. []  Booklet given  [x]  Patient verbalizes understanding. [x]  Patient denies need for tobacco cessation education stating he is decreasing. []  Unable to meet with patient today. Will follow up as able.   Carlos Choudhary  2:20 PM

## 2020-02-27 NOTE — PROGRESS NOTES
pain, chest pressure/discomfort, lower extremity edema, palpitations  Gastrointestinal:  negative for abdominal pain, constipation, diarrhea, nausea, vomiting  Neurological:  negative for dizziness, headache    Medications: Allergies: Allergies   Allergen Reactions    Asa [Aspirin] Anaphylaxis, Itching and Swelling    Codeine Itching and Swelling       Current Meds:   Scheduled Meds:    gabapentin  100 mg Oral TID    vitamin B-6  50 mg Oral Daily    thiamine  100 mg Oral Daily    folic acid  1 mg Oral Daily    niacin  500 mg Oral Nightly    atorvastatin  20 mg Oral Daily    lisinopril-hydroCHLOROthiazide  1 tablet Oral Daily    mirtazapine  45 mg Oral Nightly    sodium chloride flush  10 mL Intravenous 2 times per day    enoxaparin  40 mg Subcutaneous Daily    methylPREDNISolone  60 mg Intravenous Daily    asenapine maleate  5 mg Sublingual BID     Continuous Infusions:    sodium chloride 100 mL/hr at 20 1135     PRN Meds: sodium chloride flush, potassium chloride **OR** potassium alternative oral replacement **OR** potassium chloride, magnesium sulfate, acetaminophen, acetaminophen, polyethylene glycol, promethazine, ondansetron, nicotine, diphenhydrAMINE    Data:     Past Medical History:   has a past medical history of Hyperlipidemia, Hypertension, and Schizoaffective disorder, bipolar type ([facility] [address] Social History:   reports that he has been smoking. He has a 40.00 pack-year smoking history. He has never used smokeless tobacco. He reports that he does not drink alcohol or use drugs. Family History: No family history on file. Vitals:  /76   Pulse 86   Temp 97.6 °F (36.4 °C) (Oral)   Resp 23   Ht 5' 9\" (1.753 m)   Wt 166 lb 10.7 oz (75.6 kg)   SpO2 100%   BMI 24.61 kg/m²   Temp (24hrs), Av.8 °F (36.6 °C), Min:97 °F (36.1 °C), Max:98.8 °F (37.1 °C)    No results for input(s): POCGLU in the last 72 hours. I/O (24Hr):     Intake/Output Summary (Last 24 hours) at 2/27/2020 1832  Last data filed at 2/27/2020 1734  Gross per 24 hour   Intake 2658.46 ml   Output 250 ml   Net 2408.46 ml       Labs:  Hematology:  Recent Labs     02/27/20  0153 02/27/20  0702   WBC 4.4 7.6   RBC 4.49 4.52   HGB 14.1 14.0   HCT 41.8 41.0   MCV 93.1 90.7   MCH 31.4 31.0   MCHC 33.7 34.1   RDW 12.8 12.9    225   MPV 9.0 9.1   SEDRATE 18*  --      Chemistry:  Recent Labs     02/27/20  0153 02/27/20  0702    136   K 4.1 4.7   CL 99 102   CO2 22 20   GLUCOSE 127* 90   BUN 20 20   CREATININE 0.73 0.71   ANIONGAP 14 14   LABGLOM >60 >60   GFRAA >60 >60   CALCIUM 9.1 8.9     Recent Labs     02/27/20  0153   PROT 6.9   LABALBU 4.0   AST 12   ALT 16   ALKPHOS 91   BILITOT 0.30   BILIDIR 0.10     ABG:No results found for: POCPH, PHART, PH, POCPCO2, GGC7YVB, PCO2, POCPO2, PO2ART, PO2, POCHCO3, UDO3RCL, HCO3, NBEA, PBEA, BEART, BE, THGBART, THB, RXU4GUI, QRFA6FQN, K0WXDBFO, O2SAT, FIO2  Lab Results   Component Value Date/Time    SPECIAL NOT REPORTED 09/24/2019 10:00 AM     Lab Results   Component Value Date/Time    CULTURE NO SIGNIFICANT GROWTH 09/24/2019 10:00 AM       Radiology:  No results found.     Physical Examination:        General appearance:  alert, cooperative and no distress  Mental Status:  oriented to person, place and time and normal affect  Lungs:  clear to auscultation bilaterally, normal effort  Heart:  regular rate and rhythm, no murmur  Abdomen:  soft, nontender, nondistended, normal bowel sounds, no masses, hepatomegaly, splenomegaly  Extremities:  no edema, redness, tenderness in the calves  Skin: Thickened rough skin involving the sun exposed areas of the neck region as well as antecubital fossa and the legs    Assessment:        Hospital Problems           Last Modified POA    * (Principal) [place] 2/27/2020 Yes    SJS-TEN overlap syndrome ([address] 2/27/2020 Yes    Hypertension 2/27/2020 Yes    Hyperlipidemia 2/27/2020 Yes    Schizoaffective disorder, bipolar type ([facility]

## 2020-02-27 NOTE — PLAN OF CARE
Problem: Falls - Risk of:  Goal: Will remain free from falls  Description  Will remain free from falls  2/27/2020 1757 by Kaye Gomes RN  Outcome: Ongoing   Pt remains free of falls at this time. Bed locked in lowest position, siderails x2, call light in reach. Non-skid footwear applied. Pt ambulates in room with steady gait. Encouraged pt to call for assistance as needed for safety. Will continue to monitor.

## 2020-02-27 NOTE — PROGRESS NOTES
[facility] Nurse  Consult Note       NAME:  [address] [place] [address] RECORD NUMBER:  [id number]  AGE: 60 y.o. GENDER: male  : 1960  TODAY'S DATE:  2020    Subjective:     Reason for [facility] Evaluation and Assessment: skin lesions      Aline Huntley is a 60 y.o. male referred by:   [x] Physician  [] Nursing  [] Other:     Wound Identification:  Rash consistent with [place]. Patient did ingest an NSAID approximately 2 weeks prior despite ASA allergy. Denies any hx of similar. Denies diarrhea, denies mouth lesions. Does not appear confused. Await niacin level. PAST MEDICAL HISTORY        Diagnosis Date    Hyperlipidemia     Hypertension     Schizoaffective disorder, bipolar type ([facility] [address]        PAST SURGICAL HISTORY    Past Surgical History:   Procedure Laterality Date    EYE SURGERY Right 2014    VITRECTOMY Right 2014    with antibiotic injection       FAMILY HISTORY    No family history on file.     SOCIAL HISTORY    Social History     Tobacco Use    Smoking status: Current Every Day Smoker     Packs/day: 1.00     Years: 40.00     Pack years: 40.00    Smokeless tobacco: Never Used   Substance Use Topics    Alcohol use: No    Drug use: No       ALLERGIES    Allergies   Allergen Reactions    Asa [Aspirin] Anaphylaxis, Itching and Swelling    Codeine Itching and Swelling           Objective:      /76   Pulse 86   Temp 97.6 °F (36.4 °C) (Oral)   Resp 23   Ht 5' 9\" (1.753 m)   Wt 166 lb 10.7 oz (75.6 kg)   SpO2 100%   BMI 24.61 kg/m²   Rickie Risk Score: Rickie Scale Score: 22    LABS    CBC:   Lab Results   Component Value Date    WBC 7.6 2020    RBC 4.52 2020    HGB 14.0 2020     CMP:  Albumin:    Lab Results   Component Value Date    LABALBU 4.0 2020     PT/INR:  No results found for: PROTIME, INR  HgBA1c:  No results found for: LABA1C  PTT: No components found for: LABPTT      Assessment:     Patient Active Problem List Aquaphor to apply to rash areas. Requires rx from physician to obtain from pharmacy while in house. Oil emulsion gauze to the bilateral AC areas under dry gauze. Trial ACE wraps to secure as the roll gauze slid down his arms. Change daily. Monitor rash on back and flank for change. Specialty Bed Required : No   [] Low Air Loss   [] Pressure Redistribution  [] Fluid Immersion  [] Bariatric  [] Total Pressure Relief  [] Other:     Discharge Plan:      Patient/Caregiver Teaching:  Awaiting lab results.     [] Indicates understanding       [] Needs reinforcement  [] Unsuccessful      [x] Verbal Understanding  [] Demonstrated understanding       [] No evidence of learning  [] Refused teaching         [] N/A       Electronically signed by Lizette Dempsey RN, CWON on 2/27/2020 at 5:38 PM

## 2020-02-27 NOTE — H&P
[facility]    HISTORY AND PHYSICAL EXAMINATION            Date:   2/26/2020  Patient name:  Nina Thomson  Date of admission:  2/26/2020  8:52 PM  MRN:   [id number]  Account:  [de-identified]  YOB: 1960  PCP:    MADELEINE Pepe CNP  Room:   2761/8221-71  Code Status:    Full Code    Chief Complaint:     Rash    History Obtained From:     patient, electronic medical record    History of Present Illness:     Nina Thomson is a 60 y.o. 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 This is a 79-year-old male with a past medical history significant of hypertension hyperlipidemia bipolar schizophrenia who presented to the emergency room after he had an onset of a rash that started to spread blister and open and become painful. Apparently patient was prescribed naproxen despite his allergy to aspirin with anaphylaxis and angioedema, which he took 2 weeks prior to the eruption of the rash. On Sunday into Monday he developed bilateral antecubital erythema rash and then it spread to the trunk of his body neck and down his legs and became painful therefore he sought out treatment. .                Past Medical History:     Past Medical History:   Diagnosis Date    Hyperlipidemia     Hypertension     Schizoaffective disorder, bipolar type [facility])         Past Surgical History:     Past Surgical History:   Procedure Laterality Date    EYE SURGERY Right 2/27/2014    VITRECTOMY Right 03/03/2014    with antibiotic injection        Medications Prior to Admission:     Prior to Admission medications    Medication Sig Start Date End Date Taking?  Authorizing Provider   atorvastatin (LIPITOR) 20 MG tablet Take 20 mg by mouth daily   Yes Historical Provider, MD   lisinopril-hydrochlorothiazide (PRINZIDE;ZESTORETIC) 20-25 MG per tablet Take 1 tablet by mouth daily   Yes Historical Provider, MD   Mirtazapine (REMERON PO) Take 45 mg by mouth nightly    Yes Historical Provider, MD   asenapine maleate (SAPHRIS) 5 MG SUBL sublingual tablet Place 5 mg under the tongue 3 times daily     Historical Provider, MD        Allergies:     Asa [aspirin] and Codeine    Social History:     Tobacco:    reports that he has been smoking. He has a 40.00 pack-year smoking history. He has never used smokeless tobacco.  Alcohol:      reports no history of alcohol use. Drug Use:  reports no history of drug use. Family History:     No family history on file. Review of Systems:     Positive and Negative as described in HPI. Review of Systems   Constitutional: Negative for chills, diaphoresis and fever. HENT: Positive for postnasal drip and rhinorrhea. Negative for congestion and hearing loss. Eyes: Negative for photophobia, pain, redness, itching and visual disturbance. Respiratory: Negative for cough, shortness of breath, wheezing and stridor. Cardiovascular: Negative for chest pain, palpitations and leg swelling. Gastrointestinal: Negative for abdominal pain, blood in stool, constipation, diarrhea, nausea and vomiting. Genitourinary: Negative for dysuria and frequency. Musculoskeletal: Negative for myalgias. Skin: Positive for color change, rash and wound. Neurological: Negative for dizziness, seizures and headaches. Psychiatric/Behavioral: The patient is not nervous/anxious. Physical Exam:   BP (!) 141/77   Pulse 87   Temp 97.3 °F (36.3 °C) (Oral)   Resp 22   Ht 5' 9\" (1.753 m)   Wt 166 lb 10.7 oz (75.6 kg)   SpO2 95%   BMI 24.61 kg/m²   Temp (24hrs), Av.1 °F (36.7 °C), Min:97.3 °F (36.3 °C), Max:98.8 °F (37.1 °C)    No results for input(s): POCGLU in the last 72 hours. No intake or output data in the 24 hours ending 20 8412    Physical Exam  Vitals signs and nursing note reviewed.    Constitutional:       General: He is Sensory: No sensory deficit. Motor: Motor function is intact. Psychiatric:         Attention and Perception: Attention normal.         Mood and Affect: Mood normal.         Speech: Speech normal.         Behavior: Behavior normal. Behavior is cooperative. Cognition and Memory: Cognition normal.         Judgment: Judgment normal.         Investigations:      Laboratory Testing:  No results found for this or any previous visit (from the past 24 hour(s)). Imaging/Diagnostics:  No results found. Assessment :      Hospital Problems           Last Modified POA    SJS-TEN overlap syndrome ([facility] 2/27/2020 Yes    Hypertension 2/27/2020 Yes    Hyperlipidemia 2/27/2020 Yes    Schizoaffective disorder, bipolar type ([facility] 2/27/2020 Yes          Plan:     Patient status inpatient in the Progressive Unit/Step down    1. Rash/S.Jose Luis syndrome-we will check for mycoplasma pneumonia and strep titers. Negative nikolsky and the skin is very tight and dry. Appearance started in the peripheral limbs and then to the chest trunk. Of her did have a contact with NSAID 2 weeks prior to the onset which makes sense. No ocular or oral involvement no photophobia no fevers prior to or currently. Consider-erythroderma eruption from drugs. Will check HIV risk as well. Solu-Medrol. Apply medi- honey to the bilateral A/C where the skin is actually open. 2. Essential hypertension-continue with senna Prill hydrochlorothiazide  3. Hyperlipidemia-continue with statin therapy  4. Bipolar schizophrenia-new with home medications. No signs of kay at this time. Patient states that he takes Saphris 3 times a day versus twice a day however I have looked this up and it is not an appropriate dose will continue with twice a day and consult with psych if needed  5. GI prophylaxis  6. DVT prophylaxis  7.  CODE STATUS full    Consultations:   None     Patient is admitted as inpatient status because of co-morbidities listed above, severity of signs and symptoms as outlined, requirement for current medical therapies and most importantly because of direct risk to patient if care not provided in a hospital setting.     MADELEINE Fallon CNP  2/26/2020  11:48 PM    Copy sent to Dr. Dinesh Banks, MADELEINE - CNP

## 2020-02-28 LAB
ALBUMIN SERPL-MCNC: 3.5 G/DL (ref 3.5–5.2)
ALBUMIN/GLOBULIN RATIO: 1.3 (ref 1–2.5)
ALP BLD-CCNC: 71 U/L (ref 40–129)
ALT SERPL-CCNC: 14 U/L (ref 5–41)
ANION GAP SERPL CALCULATED.3IONS-SCNC: 9 MMOL/L (ref 9–17)
AST SERPL-CCNC: 11 U/L
BILIRUB SERPL-MCNC: 0.16 MG/DL (ref 0.3–1.2)
BUN BLDV-MCNC: 20 MG/DL (ref 8–23)
BUN/CREAT BLD: ABNORMAL (ref 9–20)
C-REACTIVE PROTEIN: 2.6 MG/L (ref 0–5)
CALCIUM SERPL-MCNC: 8.8 MG/DL (ref 8.6–10.4)
CHLORIDE BLD-SCNC: 102 MMOL/L (ref 98–107)
CO2: 25 MMOL/L (ref 20–31)
CREAT SERPL-MCNC: 0.81 MG/DL (ref 0.7–1.2)
DIRECT EXAM: NORMAL
DNASE B ANTIBODY: 152 U/ML (ref 0–260)
EKG ATRIAL RATE: 58 BPM
EKG P AXIS: 16 DEGREES
EKG P-R INTERVAL: 116 MS
EKG Q-T INTERVAL: 402 MS
EKG QRS DURATION: 98 MS
EKG QTC CALCULATION (BAZETT): 394 MS
EKG R AXIS: 4 DEGREES
EKG T AXIS: 17 DEGREES
EKG VENTRICULAR RATE: 58 BPM
GFR AFRICAN AMERICAN: >60 ML/MIN
GFR NON-AFRICAN AMERICAN: >60 ML/MIN
GFR SERPL CREATININE-BSD FRML MDRD: ABNORMAL ML/MIN/{1.73_M2}
GFR SERPL CREATININE-BSD FRML MDRD: ABNORMAL ML/MIN/{1.73_M2}
GLUCOSE BLD-MCNC: 120 MG/DL (ref 70–99)
Lab: NORMAL
MAGNESIUM: 2 MG/DL (ref 1.6–2.6)
MYCOPLASMA PNEUMONIAE IGM: 0.85
POTASSIUM SERPL-SCNC: 3.5 MMOL/L (ref 3.7–5.3)
SODIUM BLD-SCNC: 136 MMOL/L (ref 135–144)
SPECIMEN DESCRIPTION: NORMAL
T3 FREE: 2.2 PG/ML (ref 2.02–4.43)
TOTAL PROTEIN: 6.2 G/DL (ref 6.4–8.3)
TROPONIN INTERP: NORMAL
TROPONIN INTERP: NORMAL
TROPONIN T: NORMAL NG/ML
TROPONIN T: NORMAL NG/ML
TROPONIN, HIGH SENSITIVITY: 8 NG/L (ref 0–22)
TROPONIN, HIGH SENSITIVITY: <6 NG/L (ref 0–22)
TSH SERPL DL<=0.05 MIU/L-ACNC: 1.91 MIU/L (ref 0.3–5)

## 2020-02-28 PROCEDURE — 6370000000 HC RX 637 (ALT 250 FOR IP): Performed by: INTERNAL MEDICINE

## 2020-02-28 PROCEDURE — 93005 ELECTROCARDIOGRAM TRACING: CPT | Performed by: INTERNAL MEDICINE

## 2020-02-28 PROCEDURE — 84481 FREE ASSAY (FT-3): CPT

## 2020-02-28 PROCEDURE — 80053 COMPREHEN METABOLIC PANEL: CPT

## 2020-02-28 PROCEDURE — 36415 COLL VENOUS BLD VENIPUNCTURE: CPT

## 2020-02-28 PROCEDURE — 93010 ELECTROCARDIOGRAM REPORT: CPT | Performed by: INTERNAL MEDICINE

## 2020-02-28 PROCEDURE — 6360000002 HC RX W HCPCS: Performed by: PHYSICIAN ASSISTANT

## 2020-02-28 PROCEDURE — 2580000003 HC RX 258: Performed by: PHYSICIAN ASSISTANT

## 2020-02-28 PROCEDURE — 86140 C-REACTIVE PROTEIN: CPT

## 2020-02-28 PROCEDURE — 83735 ASSAY OF MAGNESIUM: CPT

## 2020-02-28 PROCEDURE — 6370000000 HC RX 637 (ALT 250 FOR IP): Performed by: NURSE PRACTITIONER

## 2020-02-28 PROCEDURE — 84484 ASSAY OF TROPONIN QUANT: CPT

## 2020-02-28 PROCEDURE — 99232 SBSQ HOSP IP/OBS MODERATE 35: CPT | Performed by: INTERNAL MEDICINE

## 2020-02-28 PROCEDURE — 6370000000 HC RX 637 (ALT 250 FOR IP): Performed by: PHYSICIAN ASSISTANT

## 2020-02-28 PROCEDURE — 2060000000 HC ICU INTERMEDIATE R&B

## 2020-02-28 PROCEDURE — 84207 ASSAY OF VITAMIN B-6: CPT

## 2020-02-28 PROCEDURE — 84443 ASSAY THYROID STIM HORMONE: CPT

## 2020-02-28 RX ORDER — UREA 10 %
3 LOTION (ML) TOPICAL NIGHTLY PRN
Status: DISCONTINUED | OUTPATIENT
Start: 2020-02-28 | End: 2020-03-01 | Stop reason: HOSPADM

## 2020-02-28 RX ADMIN — ATORVASTATIN CALCIUM 20 MG: 20 TABLET, FILM COATED ORAL at 08:52

## 2020-02-28 RX ADMIN — MIRTAZAPINE 45 MG: 15 TABLET, FILM COATED ORAL at 20:28

## 2020-02-28 RX ADMIN — ENOXAPARIN SODIUM 40 MG: 40 INJECTION SUBCUTANEOUS at 08:52

## 2020-02-28 RX ADMIN — SODIUM CHLORIDE, PRESERVATIVE FREE 10 ML: 5 INJECTION INTRAVENOUS at 08:58

## 2020-02-28 RX ADMIN — ASENAPINE MALEATE 5 MG: 5 TABLET SUBLINGUAL at 20:28

## 2020-02-28 RX ADMIN — GABAPENTIN 100 MG: 100 CAPSULE ORAL at 20:28

## 2020-02-28 RX ADMIN — METHYLPREDNISOLONE SODIUM SUCCINATE 60 MG: 125 INJECTION, POWDER, FOR SOLUTION INTRAMUSCULAR; INTRAVENOUS at 08:52

## 2020-02-28 RX ADMIN — Medication 50 MG: at 08:52

## 2020-02-28 RX ADMIN — FOLIC ACID 1 MG: 1 TABLET ORAL at 08:52

## 2020-02-28 RX ADMIN — ASENAPINE MALEATE 5 MG: 5 TABLET SUBLINGUAL at 08:52

## 2020-02-28 RX ADMIN — Medication 100 MG: at 08:52

## 2020-02-28 RX ADMIN — POTASSIUM CHLORIDE 40 MEQ: 1500 TABLET, EXTENDED RELEASE ORAL at 11:44

## 2020-02-28 RX ADMIN — GABAPENTIN 100 MG: 100 CAPSULE ORAL at 08:52

## 2020-02-28 RX ADMIN — NIACIN 500 MG: 500 TABLET, EXTENDED RELEASE ORAL at 20:28

## 2020-02-28 RX ADMIN — LISINOPRIL AND HYDROCHLOROTHIAZIDE 1 TABLET: 25; 20 TABLET ORAL at 08:52

## 2020-02-28 RX ADMIN — SODIUM CHLORIDE: 9 INJECTION, SOLUTION INTRAVENOUS at 15:54

## 2020-02-28 RX ADMIN — GABAPENTIN 100 MG: 100 CAPSULE ORAL at 14:44

## 2020-02-28 ASSESSMENT — PAIN DESCRIPTION - PROGRESSION
CLINICAL_PROGRESSION: NOT CHANGED

## 2020-02-28 NOTE — PLAN OF CARE
Problem: Falls - Risk of:  Goal: Will remain free from falls  Description  Will remain free from falls  2/28/2020 0504 by Uziel Bergman RN  Outcome: Ongoing  2/27/2020 1757 by Hayes Rivera RN  Outcome: Ongoing  Patient calls out appropriately for assistance, bed in low position, non-skid socks in place, room free of clutter. Problem: Skin Integrity:  Goal: Absence of new skin breakdown  Description  Absence of new skin breakdown  Outcome: Ongoing   Patient had no new opening blisters or skin breakdown this shift. Nurse will continue to monitor for remainder of shift.      Electronically signed by Tracy Duncan RN on 2/28/2020 at 5:05 AM

## 2020-02-28 NOTE — PROGRESS NOTES
[facility]    Progress Note    2/28/2020    4:43 PM    Name:   Elma Beltrán  MRN:     [id number]     Acct:      [de-identified]   Room:   [address] [place] [address] Day:  2  Admit Date:  2/26/2020  8:52 PM    PCP:   MADELEINE Nixon CNP  Code Status:  Full Code    Subjective:     C/C: Patient admitted in transfer for a rash that was said to be likely Potts-Jose Luis syndrome    Interval History Status: not changed. Patient was seen and examined this morning in his room. Overnight uneventful and patient skin lesions drying off and resolving. He currently denies any itchiness, fever chills nausea vomiting. He also denies diarrhea or abdominal pain. Brief History:     Elma Beltrán is a 60 y.o. 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 This is a 58-year-old male with a past medical history significant of hypertension hyperlipidemia bipolar schizophrenia who presented to the emergency room after he had an onset of a rash that started to spread blister and open and become painful. Apparently patient was prescribed naproxen despite his allergy to aspirin with anaphylaxis and angioedema, which he took 2 weeks prior to the eruption of the rash. On Sunday into Monday he developed bilateral antecubital erythema rash and then it spread to the trunk of his body neck and down his legs and became painful therefore he sought out treatment. .     Review of Systems:     Constitutional:  negative for chills, fevers, sweats  Respiratory:  negative for cough, dyspnea on exertion, shortness of breath, wheezing  Cardiovascular:  negative for chest pain, chest pressure/discomfort, lower extremity edema, palpitations  Gastrointestinal:  negative for abdominal pain, constipation, diarrhea, nausea, vomiting  Neurological:  negative for dizziness,

## 2020-02-29 LAB
ALBUMIN SERPL-MCNC: 3.9 G/DL (ref 3.5–5.2)
ANION GAP SERPL CALCULATED.3IONS-SCNC: 12 MMOL/L (ref 9–17)
BUN BLDV-MCNC: 14 MG/DL (ref 8–23)
BUN/CREAT BLD: ABNORMAL (ref 9–20)
CALCIUM SERPL-MCNC: 9.5 MG/DL (ref 8.6–10.4)
CHLORIDE BLD-SCNC: 101 MMOL/L (ref 98–107)
CO2: 25 MMOL/L (ref 20–31)
CREAT SERPL-MCNC: 0.87 MG/DL (ref 0.7–1.2)
GFR AFRICAN AMERICAN: >60 ML/MIN
GFR NON-AFRICAN AMERICAN: >60 ML/MIN
GFR SERPL CREATININE-BSD FRML MDRD: ABNORMAL ML/MIN/{1.73_M2}
GFR SERPL CREATININE-BSD FRML MDRD: ABNORMAL ML/MIN/{1.73_M2}
GLUCOSE BLD-MCNC: 113 MG/DL (ref 70–99)
GLUCOSE BLD-MCNC: 126 MG/DL (ref 75–110)
MAGNESIUM: 2 MG/DL (ref 1.6–2.6)
PHOSPHORUS: 2.4 MG/DL (ref 2.5–4.5)
POTASSIUM SERPL-SCNC: 3.9 MMOL/L (ref 3.7–5.3)
PREALBUMIN: 21.8 MG/DL (ref 20–40)
SODIUM BLD-SCNC: 138 MMOL/L (ref 135–144)

## 2020-02-29 PROCEDURE — 36415 COLL VENOUS BLD VENIPUNCTURE: CPT

## 2020-02-29 PROCEDURE — 6370000000 HC RX 637 (ALT 250 FOR IP): Performed by: INTERNAL MEDICINE

## 2020-02-29 PROCEDURE — 6370000000 HC RX 637 (ALT 250 FOR IP): Performed by: PHYSICIAN ASSISTANT

## 2020-02-29 PROCEDURE — 2580000003 HC RX 258: Performed by: PHYSICIAN ASSISTANT

## 2020-02-29 PROCEDURE — 84207 ASSAY OF VITAMIN B-6: CPT

## 2020-02-29 PROCEDURE — 82947 ASSAY GLUCOSE BLOOD QUANT: CPT

## 2020-02-29 PROCEDURE — 80069 RENAL FUNCTION PANEL: CPT

## 2020-02-29 PROCEDURE — 6360000002 HC RX W HCPCS: Performed by: PHYSICIAN ASSISTANT

## 2020-02-29 PROCEDURE — 6370000000 HC RX 637 (ALT 250 FOR IP): Performed by: NURSE PRACTITIONER

## 2020-02-29 PROCEDURE — 83735 ASSAY OF MAGNESIUM: CPT

## 2020-02-29 PROCEDURE — 84134 ASSAY OF PREALBUMIN: CPT

## 2020-02-29 PROCEDURE — 2060000000 HC ICU INTERMEDIATE R&B

## 2020-02-29 PROCEDURE — 99232 SBSQ HOSP IP/OBS MODERATE 35: CPT | Performed by: INTERNAL MEDICINE

## 2020-02-29 RX ORDER — PREDNISONE 20 MG/1
20 TABLET ORAL DAILY
Status: DISCONTINUED | OUTPATIENT
Start: 2020-02-29 | End: 2020-03-01 | Stop reason: HOSPADM

## 2020-02-29 RX ADMIN — GABAPENTIN 100 MG: 100 CAPSULE ORAL at 08:44

## 2020-02-29 RX ADMIN — MIRTAZAPINE 45 MG: 15 TABLET, FILM COATED ORAL at 20:17

## 2020-02-29 RX ADMIN — Medication 50 MG: at 08:43

## 2020-02-29 RX ADMIN — ENOXAPARIN SODIUM 40 MG: 40 INJECTION SUBCUTANEOUS at 08:43

## 2020-02-29 RX ADMIN — PREDNISONE 20 MG: 20 TABLET ORAL at 10:30

## 2020-02-29 RX ADMIN — METHYLPREDNISOLONE SODIUM SUCCINATE 60 MG: 125 INJECTION, POWDER, FOR SOLUTION INTRAMUSCULAR; INTRAVENOUS at 08:43

## 2020-02-29 RX ADMIN — ATORVASTATIN CALCIUM 20 MG: 20 TABLET, FILM COATED ORAL at 08:43

## 2020-02-29 RX ADMIN — ASENAPINE MALEATE 5 MG: 5 TABLET SUBLINGUAL at 08:43

## 2020-02-29 RX ADMIN — GABAPENTIN 100 MG: 100 CAPSULE ORAL at 20:18

## 2020-02-29 RX ADMIN — ASENAPINE MALEATE 5 MG: 5 TABLET SUBLINGUAL at 20:18

## 2020-02-29 RX ADMIN — FOLIC ACID 1 MG: 1 TABLET ORAL at 08:43

## 2020-02-29 RX ADMIN — NIACIN 500 MG: 500 TABLET, EXTENDED RELEASE ORAL at 20:17

## 2020-02-29 RX ADMIN — GABAPENTIN 100 MG: 100 CAPSULE ORAL at 14:53

## 2020-02-29 RX ADMIN — LISINOPRIL AND HYDROCHLOROTHIAZIDE 1 TABLET: 25; 20 TABLET ORAL at 08:43

## 2020-02-29 RX ADMIN — SODIUM CHLORIDE, PRESERVATIVE FREE 10 ML: 5 INJECTION INTRAVENOUS at 20:18

## 2020-02-29 RX ADMIN — Medication 100 MG: at 08:43

## 2020-02-29 NOTE — PLAN OF CARE
Patient able to communicate presence of pain and needs for medication. See MAR. Patient assessed for fall risk and fall precautions initiated. Patient and family instructed about safety devices and allowed to make decisions related to safety. Environment kept free of clutter and adequate lighting provided. Bed in lowest position with brakes locked. Call light in reach. Patient ID band correct and in place. Patient transferred with appropriate methods. Will continue to monitor. Patient free of new skin breakdown throughout the shift. Rash beginning to heal and dry. Dressing changes daily and as needed as per wound care order. Will continue to monitor.   Jody Pollock RN

## 2020-02-29 NOTE — PROGRESS NOTES
constipation, diarrhea, nausea, vomiting  Neurological:  negative for dizziness, headache    Medications: Allergies: Allergies   Allergen Reactions    Asa [Aspirin] Anaphylaxis, Itching and Swelling    Codeine Itching and Swelling       Current Meds:   Scheduled Meds:    predniSONE  20 mg Oral Daily    gabapentin  100 mg Oral TID    vitamin B-6  50 mg Oral Daily    thiamine  100 mg Oral Daily    folic acid  1 mg Oral Daily    niacin  500 mg Oral Nightly    atorvastatin  20 mg Oral Daily    lisinopril-hydroCHLOROthiazide  1 tablet Oral Daily    mirtazapine  45 mg Oral Nightly    sodium chloride flush  10 mL Intravenous 2 times per day    enoxaparin  40 mg Subcutaneous Daily    asenapine maleate  5 mg Sublingual BID     Continuous Infusions:    sodium chloride 100 mL/hr at 20 1554     PRN Meds: melatonin, sodium chloride flush, potassium chloride **OR** potassium alternative oral replacement **OR** potassium chloride, magnesium sulfate, acetaminophen, acetaminophen, polyethylene glycol, promethazine, ondansetron, nicotine, diphenhydrAMINE    Data:     Past Medical History:   has a past medical history of Hyperlipidemia, Hypertension, and Schizoaffective disorder, bipolar type ([facility] Social History:   reports that he has been smoking. He has a 40.00 pack-year smoking history. He has never used smokeless tobacco. He reports that he does not drink alcohol or use drugs. Family History: No family history on file. Vitals:  /72   Pulse 57   Temp 98.2 °F (36.8 °C) (Temporal)   Resp 16   Ht 5' 9\" (1.753 m)   Wt 166 lb 10.7 oz (75.6 kg)   SpO2 94%   BMI 24.61 kg/m²   Temp (24hrs), Av.8 °F (36.6 °C), Min:97.4 °F (36.3 °C), Max:98.2 °F (36.8 °C)    No results for input(s): POCGLU in the last 72 hours. I/O (24Hr):     Intake/Output Summary (Last 24 hours) at 2020 7335  Last data filed at 2020 1622  Gross per 24 hour   Intake 3922 ml   Output 4000 ml   Net -78 ml

## 2020-03-01 VITALS
BODY MASS INDEX: 26.48 KG/M2 | OXYGEN SATURATION: 97 % | HEIGHT: 69 IN | RESPIRATION RATE: 18 BRPM | DIASTOLIC BLOOD PRESSURE: 82 MMHG | SYSTOLIC BLOOD PRESSURE: 140 MMHG | TEMPERATURE: 100.2 F | HEART RATE: 52 BPM | WEIGHT: 178.79 LBS

## 2020-03-01 LAB
ALBUMIN SERPL-MCNC: 3.9 G/DL (ref 3.5–5.2)
ANION GAP SERPL CALCULATED.3IONS-SCNC: 15 MMOL/L (ref 9–17)
BUN BLDV-MCNC: 25 MG/DL (ref 8–23)
BUN/CREAT BLD: ABNORMAL (ref 9–20)
CALCIUM SERPL-MCNC: 9.7 MG/DL (ref 8.6–10.4)
CHLORIDE BLD-SCNC: 98 MMOL/L (ref 98–107)
CO2: 25 MMOL/L (ref 20–31)
CREAT SERPL-MCNC: 0.99 MG/DL (ref 0.7–1.2)
GFR AFRICAN AMERICAN: >60 ML/MIN
GFR NON-AFRICAN AMERICAN: >60 ML/MIN
GFR SERPL CREATININE-BSD FRML MDRD: ABNORMAL ML/MIN/{1.73_M2}
GFR SERPL CREATININE-BSD FRML MDRD: ABNORMAL ML/MIN/{1.73_M2}
GLUCOSE BLD-MCNC: 100 MG/DL (ref 75–110)
GLUCOSE BLD-MCNC: 125 MG/DL (ref 70–99)
GLUCOSE BLD-MCNC: 90 MG/DL (ref 75–110)
PHOSPHORUS: 3.5 MG/DL (ref 2.5–4.5)
POTASSIUM SERPL-SCNC: 3.5 MMOL/L (ref 3.7–5.3)
SODIUM BLD-SCNC: 138 MMOL/L (ref 135–144)
VITAMIN B6: 10.2 NMOL/L (ref 20–125)

## 2020-03-01 PROCEDURE — 82947 ASSAY GLUCOSE BLOOD QUANT: CPT

## 2020-03-01 PROCEDURE — 36415 COLL VENOUS BLD VENIPUNCTURE: CPT

## 2020-03-01 PROCEDURE — 84207 ASSAY OF VITAMIN B-6: CPT

## 2020-03-01 PROCEDURE — 6370000000 HC RX 637 (ALT 250 FOR IP): Performed by: NURSE PRACTITIONER

## 2020-03-01 PROCEDURE — 99239 HOSP IP/OBS DSCHRG MGMT >30: CPT | Performed by: INTERNAL MEDICINE

## 2020-03-01 PROCEDURE — 2580000003 HC RX 258: Performed by: PHYSICIAN ASSISTANT

## 2020-03-01 PROCEDURE — 6360000002 HC RX W HCPCS: Performed by: PHYSICIAN ASSISTANT

## 2020-03-01 PROCEDURE — 80069 RENAL FUNCTION PANEL: CPT

## 2020-03-01 PROCEDURE — 6370000000 HC RX 637 (ALT 250 FOR IP): Performed by: INTERNAL MEDICINE

## 2020-03-01 PROCEDURE — 6370000000 HC RX 637 (ALT 250 FOR IP): Performed by: PHYSICIAN ASSISTANT

## 2020-03-01 RX ORDER — FOLIC ACID 1 MG/1
1 TABLET ORAL DAILY
Qty: 30 TABLET | Refills: 3 | Status: SHIPPED | OUTPATIENT
Start: 2020-03-02

## 2020-03-01 RX ORDER — LANOLIN ALCOHOL/MO/W.PET/CERES
100 CREAM (GRAM) TOPICAL DAILY
Qty: 30 TABLET | Refills: 3 | Status: SHIPPED | OUTPATIENT
Start: 2020-03-02

## 2020-03-01 RX ORDER — GABAPENTIN 100 MG/1
100 CAPSULE ORAL 3 TIMES DAILY
Qty: 90 CAPSULE | Refills: 0 | Status: SHIPPED | OUTPATIENT
Start: 2020-03-01 | End: 2020-03-31

## 2020-03-01 RX ORDER — NIACIN 500 MG/1
500 TABLET, EXTENDED RELEASE ORAL NIGHTLY
Qty: 30 TABLET | Refills: 3 | Status: SHIPPED | OUTPATIENT
Start: 2020-03-01

## 2020-03-01 RX ORDER — PREDNISONE 20 MG/1
20 TABLET ORAL DAILY
Qty: 5 TABLET | Refills: 0 | Status: SHIPPED | OUTPATIENT
Start: 2020-03-02 | End: 2020-03-07

## 2020-03-01 RX ORDER — POTASSIUM CHLORIDE 750 MG/1
10 TABLET, EXTENDED RELEASE ORAL DAILY
Qty: 30 TABLET | Refills: 1 | Status: SHIPPED | OUTPATIENT
Start: 2020-03-01

## 2020-03-01 RX ORDER — PYRIDOXINE HCL (VITAMIN B6) 50 MG
50 TABLET ORAL DAILY
Qty: 30 TABLET | Refills: 3 | Status: SHIPPED | OUTPATIENT
Start: 2020-03-02

## 2020-03-01 RX ORDER — POTASSIUM CHLORIDE 20 MEQ/1
40 TABLET, EXTENDED RELEASE ORAL ONCE
Status: COMPLETED | OUTPATIENT
Start: 2020-03-01 | End: 2020-03-01

## 2020-03-01 RX ADMIN — GABAPENTIN 100 MG: 100 CAPSULE ORAL at 13:49

## 2020-03-01 RX ADMIN — GABAPENTIN 100 MG: 100 CAPSULE ORAL at 10:05

## 2020-03-01 RX ADMIN — ENOXAPARIN SODIUM 40 MG: 40 INJECTION SUBCUTANEOUS at 10:06

## 2020-03-01 RX ADMIN — PREDNISONE 20 MG: 20 TABLET ORAL at 10:04

## 2020-03-01 RX ADMIN — ATORVASTATIN CALCIUM 20 MG: 20 TABLET, FILM COATED ORAL at 10:05

## 2020-03-01 RX ADMIN — SODIUM CHLORIDE, PRESERVATIVE FREE 10 ML: 5 INJECTION INTRAVENOUS at 10:10

## 2020-03-01 RX ADMIN — POLYETHYLENE GLYCOL 3350 17 G: 17 POWDER, FOR SOLUTION ORAL at 10:20

## 2020-03-01 RX ADMIN — POTASSIUM CHLORIDE 40 MEQ: 1500 TABLET, EXTENDED RELEASE ORAL at 13:49

## 2020-03-01 RX ADMIN — Medication 100 MG: at 10:05

## 2020-03-01 RX ADMIN — Medication 50 MG: at 10:04

## 2020-03-01 RX ADMIN — ASENAPINE MALEATE 5 MG: 5 TABLET SUBLINGUAL at 10:05

## 2020-03-01 RX ADMIN — FOLIC ACID 1 MG: 1 TABLET ORAL at 10:04

## 2020-03-01 RX ADMIN — LISINOPRIL AND HYDROCHLOROTHIAZIDE 1 TABLET: 25; 20 TABLET ORAL at 10:05

## 2020-03-01 NOTE — PROGRESS NOTES
CLINICAL PHARMACY NOTE: MEDS TO [address] Select Patient?: No  Total # of Prescriptions Filled: 3   The following medications were delivered to the patient:  · Gabapentin 100mg  · Potassium Chloride Karmen ER 10mg  · Prednisone 20mg  Total # of Interventions Completed: 0  Time Spent (min): 45    Additional Documentation:

## 2020-03-01 NOTE — PLAN OF CARE
Problem: Falls - Risk of:  Goal: Will remain free from falls  Description  Will remain free from falls  3/1/2020 0217 by Ean Hurtado RN  Outcome: Ongoing  Pt assessed as a fall risk this shift. Pt remains free from falls at this time. Fall precautions in place, including falling star sign on door and fall risk band on pt. Floor free from obstacles, and bed is locked and in lowest position. Adequate lighting provided. Call light within reach; pt encouraged to call before getting OOB for any need. Bed alarm activated. Will continue to monitor needs during hourly rounding. Problem: Skin Integrity:  Goal: Will show no infection signs and symptoms  Description  Will show no infection signs and symptoms  3/1/2020 0217 by Ean Hurtado RN  Outcome: Ongoing  Full skin assessment completed this shift. No new skin breakdown at this time. Pt repositioned q2h and prn. Pt kept clean and dry. Advanced gel overlay mattress in place on bed, heels floated. Will continue to monitor.

## 2020-03-01 NOTE — CARE COORDINATION
Discharge order noted. Per RN, attending would like patient to have home care for wound management. Met with patient. Given choice, referral sent to [facility]. Call to [facility], spoke to [place], notified of discharge home today.     Discharge [facility] Case Management Department  Written by: Isaac Mendieta RN    Patient Name: Otto Mcconnell  Attending Provider: Tom Matta MD  Admit Date: 2020  8:52 PM  MRN: [id number]  Account: [de-identified]                     : 1960  Discharge Date:  20       Disposition: home with Chitra Reynolds RN

## 2020-03-03 LAB — VITAMIN B6: 135.6 NMOL/L (ref 20–125)

## 2020-03-04 LAB
VITAMIN B6: 105 NMOL/L (ref 20–125)
VITAMIN B6: 213.6 NMOL/L (ref 20–125)

## 2020-03-06 LAB — NIACIN: 2.87 UG/ML (ref 0.5–8.45)

## 2020-10-22 ENCOUNTER — HOSPITAL ENCOUNTER (OUTPATIENT)
Age: 60
Setting detail: SPECIMEN
Discharge: HOME | End: 2020-10-22
Payer: COMMERCIAL

## 2020-10-22 LAB
ABSOLUTE EOS #: 0.1 K/UL (ref 0–0.44)
ABSOLUTE IMMATURE GRANULOCYTE: <0.03 K/UL (ref 0–0.3)
ABSOLUTE LYMPH #: 1.01 K/UL (ref 1.1–3.7)
ABSOLUTE MONO #: 0.26 K/UL (ref 0.1–1.2)
ALBUMIN SERPL-MCNC: 4.5 G/DL (ref 3.5–5.2)
ALBUMIN/GLOBULIN RATIO: 1.7 (ref 1–2.5)
ALP BLD-CCNC: 87 U/L (ref 40–129)
ALT SERPL-CCNC: 18 U/L (ref 5–41)
ANION GAP SERPL CALCULATED.3IONS-SCNC: 12 MMOL/L (ref 9–17)
AST SERPL-CCNC: 18 U/L
BASOPHILS # BLD: 1 % (ref 0–2)
BASOPHILS ABSOLUTE: 0.04 K/UL (ref 0–0.2)
BILIRUB SERPL-MCNC: 0.25 MG/DL (ref 0.3–1.2)
BUN BLDV-MCNC: 11 MG/DL (ref 8–23)
BUN/CREAT BLD: ABNORMAL (ref 9–20)
CALCIUM SERPL-MCNC: 9.8 MG/DL (ref 8.6–10.4)
CHLORIDE BLD-SCNC: 103 MMOL/L (ref 98–107)
CHOLESTEROL/HDL RATIO: 4.1
CHOLESTEROL: 198 MG/DL
CO2: 26 MMOL/L (ref 20–31)
CREAT SERPL-MCNC: 1.05 MG/DL (ref 0.7–1.2)
DIFFERENTIAL TYPE: ABNORMAL
EOSINOPHILS RELATIVE PERCENT: 2 % (ref 1–4)
GFR AFRICAN AMERICAN: >60 ML/MIN
GFR NON-AFRICAN AMERICAN: >60 ML/MIN
GFR SERPL CREATININE-BSD FRML MDRD: ABNORMAL ML/MIN/{1.73_M2}
GFR SERPL CREATININE-BSD FRML MDRD: ABNORMAL ML/MIN/{1.73_M2}
GLUCOSE BLD-MCNC: 92 MG/DL (ref 70–99)
HCT VFR BLD CALC: 42 % (ref 40.7–50.3)
HDLC SERPL-MCNC: 48 MG/DL
HEMOGLOBIN: 13.5 G/DL (ref 13–17)
IMMATURE GRANULOCYTES: 0 %
LDL CHOLESTEROL: 137 MG/DL (ref 0–130)
LYMPHOCYTES # BLD: 20 % (ref 24–43)
MCH RBC QN AUTO: 31.2 PG (ref 25.2–33.5)
MCHC RBC AUTO-ENTMCNC: 32.1 G/DL (ref 28.4–34.8)
MCV RBC AUTO: 97 FL (ref 82.6–102.9)
MONOCYTES # BLD: 5 % (ref 3–12)
NRBC AUTOMATED: 0 PER 100 WBC
PDW BLD-RTO: 13.5 % (ref 11.8–14.4)
PLATELET # BLD: 230 K/UL (ref 138–453)
PLATELET ESTIMATE: ABNORMAL
PMV BLD AUTO: 11.8 FL (ref 8.1–13.5)
POTASSIUM SERPL-SCNC: 4.6 MMOL/L (ref 3.7–5.3)
RBC # BLD: 4.33 M/UL (ref 4.21–5.77)
RBC # BLD: ABNORMAL 10*6/UL
SEG NEUTROPHILS: 72 % (ref 36–65)
SEGMENTED NEUTROPHILS ABSOLUTE COUNT: 3.74 K/UL (ref 1.5–8.1)
SODIUM BLD-SCNC: 141 MMOL/L (ref 135–144)
TOTAL PROTEIN: 7.2 G/DL (ref 6.4–8.3)
TRIGL SERPL-MCNC: 64 MG/DL
TSH SERPL DL<=0.05 MIU/L-ACNC: 1.64 MIU/L (ref 0.3–5)
VLDLC SERPL CALC-MCNC: ABNORMAL MG/DL (ref 1–30)
WBC # BLD: 5.2 K/UL (ref 3.5–11.3)
WBC # BLD: ABNORMAL 10*3/UL

## 2023-07-06 ENCOUNTER — TELEPHONE (OUTPATIENT)
Dept: GASTROENTEROLOGY | Age: 63
End: 2023-07-06

## 2023-07-06 NOTE — TELEPHONE ENCOUNTER
Procedure scheduled/Hamdani  Colon EMR  Dx:  high grade dysplasia of colon adenoma  Referring: Sherman Fuentes MD  7/20/23   1:30 pm  Chris Early    Records in media. Golytely bowel prep instructions mailed to patient. Pharmacy: [facility] outpatient.

## 2023-07-07 RX ORDER — POLYETHYLENE GLYCOL 3350, SODIUM SULFATE ANHYDROUS, SODIUM BICARBONATE, SODIUM CHLORIDE, POTASSIUM CHLORIDE 236; 22.74; 6.74; 5.86; 2.97 G/4L; G/4L; G/4L; G/4L; G/4L
4 POWDER, FOR SOLUTION ORAL ONCE
Qty: 4000 ML | Refills: 0 | Status: SHIPPED | OUTPATIENT
Start: 2023-07-07 | End: 2023-07-07

## 2023-07-20 ENCOUNTER — ANESTHESIA (OUTPATIENT)
Dept: OPERATING ROOM | Age: 63
End: 2023-07-20
Payer: COMMERCIAL

## 2023-07-20 ENCOUNTER — ANESTHESIA EVENT (OUTPATIENT)
Dept: OPERATING ROOM | Age: 63
End: 2023-07-20
Payer: COMMERCIAL

## 2023-07-20 ENCOUNTER — HOSPITAL ENCOUNTER (OUTPATIENT)
Age: 63
Setting detail: OUTPATIENT SURGERY
Discharge: HOME | End: 2023-07-20
Attending: INTERNAL MEDICINE | Admitting: INTERNAL MEDICINE
Payer: COMMERCIAL

## 2023-07-20 VITALS
TEMPERATURE: 96.8 F | HEART RATE: 49 BPM | OXYGEN SATURATION: 99 % | WEIGHT: 155 LBS | HEIGHT: 69 IN | RESPIRATION RATE: 20 BRPM | BODY MASS INDEX: 22.96 KG/M2 | DIASTOLIC BLOOD PRESSURE: 87 MMHG | SYSTOLIC BLOOD PRESSURE: 148 MMHG

## 2023-07-20 DIAGNOSIS — D37.4 VILLOUS ADENOMA OF COLON: ICD-10-CM

## 2023-07-20 PROCEDURE — 2580000003 HC RX 258: Performed by: SPECIALIST

## 2023-07-20 PROCEDURE — 7100000010 HC PHASE II RECOVERY - FIRST 15 MIN: Performed by: INTERNAL MEDICINE

## 2023-07-20 PROCEDURE — 3700000000 HC ANESTHESIA ATTENDED CARE: Performed by: INTERNAL MEDICINE

## 2023-07-20 PROCEDURE — 2709999900 HC NON-CHARGEABLE SUPPLY: Performed by: INTERNAL MEDICINE

## 2023-07-20 PROCEDURE — 6360000002 HC RX W HCPCS: Performed by: SPECIALIST

## 2023-07-20 PROCEDURE — 2500000003 HC RX 250 WO HCPCS: Performed by: SPECIALIST

## 2023-07-20 PROCEDURE — 7100000011 HC PHASE II RECOVERY - ADDTL 15 MIN: Performed by: INTERNAL MEDICINE

## 2023-07-20 PROCEDURE — 3609010200 HC COLONOSCOPY ABLATION TUMOR POLYP/OTHER LES: Performed by: INTERNAL MEDICINE

## 2023-07-20 PROCEDURE — 2580000003 HC RX 258: Performed by: ANESTHESIOLOGY

## 2023-07-20 PROCEDURE — 3700000001 HC ADD 15 MINUTES (ANESTHESIA): Performed by: INTERNAL MEDICINE

## 2023-07-20 PROCEDURE — 88305 TISSUE EXAM BY PATHOLOGIST: CPT

## 2023-07-20 PROCEDURE — C1889 IMPLANT/INSERT DEVICE, NOC: HCPCS | Performed by: INTERNAL MEDICINE

## 2023-07-20 RX ORDER — PROPOFOL 10 MG/ML
INJECTION, EMULSION INTRAVENOUS PRN
Status: DISCONTINUED | OUTPATIENT
Start: 2023-07-20 | End: 2023-07-20 | Stop reason: SDUPTHER

## 2023-07-20 RX ORDER — PROPOFOL 10 MG/ML
INJECTION, EMULSION INTRAVENOUS CONTINUOUS PRN
Status: DISCONTINUED | OUTPATIENT
Start: 2023-07-20 | End: 2023-07-20 | Stop reason: SDUPTHER

## 2023-07-20 RX ORDER — LIDOCAINE HYDROCHLORIDE 20 MG/ML
INJECTION, SOLUTION EPIDURAL; INFILTRATION; INTRACAUDAL; PERINEURAL PRN
Status: DISCONTINUED | OUTPATIENT
Start: 2023-07-20 | End: 2023-07-20 | Stop reason: SDUPTHER

## 2023-07-20 RX ORDER — SODIUM CHLORIDE, SODIUM LACTATE, POTASSIUM CHLORIDE, CALCIUM CHLORIDE 600; 310; 30; 20 MG/100ML; MG/100ML; MG/100ML; MG/100ML
INJECTION, SOLUTION INTRAVENOUS CONTINUOUS
Status: DISCONTINUED | OUTPATIENT
Start: 2023-07-20 | End: 2023-07-20 | Stop reason: HOSPADM

## 2023-07-20 RX ORDER — SODIUM CHLORIDE, SODIUM LACTATE, POTASSIUM CHLORIDE, CALCIUM CHLORIDE 600; 310; 30; 20 MG/100ML; MG/100ML; MG/100ML; MG/100ML
INJECTION, SOLUTION INTRAVENOUS CONTINUOUS PRN
Status: DISCONTINUED | OUTPATIENT
Start: 2023-07-20 | End: 2023-07-20 | Stop reason: SDUPTHER

## 2023-07-20 RX ADMIN — PROPOFOL 50 MG: 10 INJECTION, EMULSION INTRAVENOUS at 14:34

## 2023-07-20 RX ADMIN — SODIUM CHLORIDE, POTASSIUM CHLORIDE, SODIUM LACTATE AND CALCIUM CHLORIDE: 600; 310; 30; 20 INJECTION, SOLUTION INTRAVENOUS at 12:23

## 2023-07-20 RX ADMIN — PROPOFOL 100 MG: 10 INJECTION, EMULSION INTRAVENOUS at 14:27

## 2023-07-20 RX ADMIN — PROPOFOL 110 MCG/KG/MIN: 10 INJECTION, EMULSION INTRAVENOUS at 14:36

## 2023-07-20 RX ADMIN — SODIUM CHLORIDE, POTASSIUM CHLORIDE, SODIUM LACTATE AND CALCIUM CHLORIDE: 600; 310; 30; 20 INJECTION, SOLUTION INTRAVENOUS at 13:18

## 2023-07-20 RX ADMIN — LIDOCAINE HYDROCHLORIDE 80 MG: 20 INJECTION, SOLUTION EPIDURAL; INFILTRATION; INTRACAUDAL; PERINEURAL at 14:27

## 2023-07-20 ASSESSMENT — PAIN - FUNCTIONAL ASSESSMENT: PAIN_FUNCTIONAL_ASSESSMENT: 0-10

## 2023-07-20 NOTE — OP NOTE
rectum and advanced without difficulty  to the cecum, which was identified by the ileocecal valve and appendiceal orifice. The prep was inadequate. Examination of the mucosa was performed during both introduction and withdrawal of the colonoscope. Retroflexed view of the rectum was performed. The patient  was taken to the recovery area in good condition. Findings:     1.  7 millimeter sessile polyp was seen in the cecum. Cold snare polypectomy was done. Resection retrieval was complete. 2.  6 mm sessile polyp was seen in the ascending colon. Cold snare polypectomy was done. Resection retrieval was complete. 3.  8 mm sessile polyp was seen in the descending colon. Cold snare polypectomy was done. Resection retrieval was complete. 4.  Large semisessile erythematous polyp was seen 40 cm from the anus. EMR was instituted. Polyp was lifted using [place]. Post lifting the polyp was removed en bloc using hot snare cautery. Post polypectomy the margins were treated with soft-tipped snare coagulation. The defect was closed using 3 hemostatic clips. Area next to the polyp was tattooed with 1 mm of [place] ink. Post tattooing there was evidence of mild bleeding from the tattoo site which was treated with 1 hemostatic clips. No bleeding at the end of the procedure. 5.  Sigmoid diverticulosis. 6.  Internal hemorrhoids were seen during regular colonoscopy. Recommendations: Follow-up with the biopsy results. Repeat colonoscopy in 6 months with 2-day prep. Follow-up with PCP.     Jada Billingsley MD     Electronically signed by Honor Buerger, MD on 7/20/2023 at 3:07 PM

## 2023-07-20 NOTE — DISCHARGE INSTRUCTIONS
[facility]    POST-ENDOSCOPY INSTRUCTIONS    1. ACTIVITY   No driving, operating machinery, or making important decisions for 24 hours. Resume normal activity after 24 hours. You may return to work after 24 hours. 2. DIET       Soft diet today and advance diet as tolerated by tomorrow    3. MEDICATIONS    Resume your usual medications. No NSAIDs for 2 pain as needed    Do not consume alcohol, tranquilizers, or sleeping medications for 24 hour unless advised by your physician. 4. PHYSICIAN FOLLOW-UP / INSTRUCTIONS    Please call the office/clinic in 10 days for biopsy results:      [  ] GI office:  [phone]          Follow up with your family physician as planned. 6. NORMAL CHANGES YOU MAY EXPERIENCE AFTER ENDOSCOPY:        COLONOSCOPY    Passing of gas for several hours. Some mild abdominal cramping. You may feel fatigued for the next 24-48   hours due to the prep and sedation    7. CALL YOUR PHYSICIAN IF YOU EXPERIENCE ANY OF THE FOLLOWING      A. Passing blood rectally or vomiting blood (color may be red or black)      B. Severe abdominal pain or tenderness (that is not relieved by passing air)      C.   Fever, chills, or excessive sweating      D.  Persistent nausea or vomiting      E.  Redness or swelling at the IV site    If you have additional questions, PLEASE call your doctor or the [address] [facility] ([phone])

## 2023-07-20 NOTE — ANESTHESIA POSTPROCEDURE EVALUATION
Department of Anesthesiology  Postprocedure Note    Patient: Cait Salazar  MRN: [id number]  YOB: 1960  Date of evaluation: 7/20/2023      Procedure Summary     Date: 07/20/23 Room / Location: [place] [address] / [facility] - INPATIENT    Anesthesia Start: 9435 Anesthesia Stop: 5243    Procedure: COLONOSCOPY W/ ENDOSCOPIC MUCOSAL RESECTION, COLD SNARE POLYPECTOMIES Diagnosis:       Villous adenoma of colon      (Villous adenoma of colon [D37.4])    Surgeons: Juan A Tamayo MD Responsible Provider: Marinus Ganser, MD    Anesthesia Type: MAC ASA Status: 3          Anesthesia Type: No value filed.     Say Phase I:      Say Phase II: Say Score: 10      Anesthesia Post Evaluation    Comments: [facility]

## 2023-07-20 NOTE — ANESTHESIA PRE PROCEDURE
10/22/2020 10:31 AM    CREATININE 1.05 10/22/2020 10:31 AM    GFRAA >60 10/22/2020 10:31 AM    LABGLOM >60 10/22/2020 10:31 AM    GLUCOSE 92 10/22/2020 10:31 AM    PROT 7.2 10/22/2020 10:31 AM    CALCIUM 9.8 10/22/2020 10:31 AM    BILITOT 0.25 10/22/2020 10:31 AM    ALKPHOS 87 10/22/2020 10:31 AM    AST 18 10/22/2020 10:31 AM    ALT 18 10/22/2020 10:31 AM       POC Tests: No results for input(s): POCGLU, POCNA, POCK, POCCL, POCBUN, POCHEMO, POCHCT in the last 72 hours. Coags: No results found for: PROTIME, INR, APTT    HCG (If Applicable): No results found for: PREGTESTUR, PREGSERUM, HCG, HCGQUANT     ABGs: No results found for: PHART, PO2ART, OLU1PNR, QIE1RYE, BEART, I8APAWWQ     Type & Screen (If Applicable):  No results found for: LABABO, LABRH    Drug/Infectious Status (If Applicable):  No results found for: HIV, HEPCAB    COVID-19 Screening (If Applicable): No results found for: COVID19        Anesthesia Evaluation    Airway: Mallampati: II  TM distance: >3 FB   Neck ROM: full  Mouth opening: > = 3 FB   Dental:    (+) lower dentures, upper dentures and edentulous      Pulmonary:Negative Pulmonary ROS and normal exam                               Cardiovascular:    (+) hypertension:,                   Neuro/Psych:   (+) neuromuscular disease:, psychiatric history:depression/anxiety             GI/Hepatic/Renal:             Endo/Other:                     Abdominal: normal exam            Vascular: Other Findings:           Anesthesia Plan      MAC     ASA 3       Induction: intravenous. MIPS: Postoperative opioids intended and Prophylactic antiemetics administered. Anesthetic plan and risks discussed with patient and child/children. Plan discussed with CRNA.                     Tyree Ventura MD   7/20/2023

## 2023-07-21 ENCOUNTER — TELEPHONE (OUTPATIENT)
Dept: GASTROENTEROLOGY | Age: 63
End: 2023-07-21

## 2023-07-21 NOTE — TELEPHONE ENCOUNTER
----- Message from Ernesto Sylvester MD sent at 7/20/2023  3:20 PM EDT -----  Repeat colonoscopy in 6 months with 2-day prep

## 2023-07-25 LAB — SURGICAL PATHOLOGY REPORT: NORMAL

## 2024-01-31 RX ORDER — FLUTICASONE PROPIONATE 50 MCG
1 SPRAY, SUSPENSION (ML) NASAL DAILY PRN
COMMUNITY
Start: 2023-05-22

## 2024-01-31 RX ORDER — BUSPIRONE HYDROCHLORIDE 10 MG/1
10 TABLET ORAL 3 TIMES DAILY
COMMUNITY
Start: 2023-05-11

## 2024-01-31 RX ORDER — ESCITALOPRAM OXALATE 20 MG/1
30 TABLET ORAL DAILY
COMMUNITY
Start: 2022-11-18

## 2024-02-01 ENCOUNTER — HOSPITAL ENCOUNTER (OUTPATIENT)
Age: 64
Setting detail: OUTPATIENT SURGERY
Discharge: HOME | End: 2024-02-01
Attending: INTERNAL MEDICINE | Admitting: INTERNAL MEDICINE
Payer: COMMERCIAL

## 2024-02-01 ENCOUNTER — ANESTHESIA (OUTPATIENT)
Dept: OPERATING ROOM | Age: 64
End: 2024-02-01
Payer: COMMERCIAL

## 2024-02-01 ENCOUNTER — ANESTHESIA EVENT (OUTPATIENT)
Dept: OPERATING ROOM | Age: 64
End: 2024-02-01
Payer: COMMERCIAL

## 2024-02-01 ENCOUNTER — TELEPHONE (OUTPATIENT)
Dept: GASTROENTEROLOGY | Age: 64
End: 2024-02-01

## 2024-02-01 VITALS
WEIGHT: 146 LBS | SYSTOLIC BLOOD PRESSURE: 116 MMHG | BODY MASS INDEX: 21.62 KG/M2 | OXYGEN SATURATION: 100 % | HEART RATE: 56 BPM | RESPIRATION RATE: 18 BRPM | HEIGHT: 69 IN | DIASTOLIC BLOOD PRESSURE: 68 MMHG | TEMPERATURE: 98.4 F

## 2024-02-01 DIAGNOSIS — Z86.010 HX OF COLONIC POLYPS: ICD-10-CM

## 2024-02-01 LAB
BUN BLD-MCNC: 13 MG/DL (ref 8–26)
CA-I BLD-SCNC: 1.33 MMOL/L (ref 1.15–1.33)
CHLORIDE BLD-SCNC: 101 MMOL/L (ref 98–107)
CO2 BLD CALC-SCNC: 32 MMOL/L (ref 22–30)
EGFR, POC: >60 ML/MIN/1.73M2
GLUCOSE BLD-MCNC: 100 MG/DL (ref 74–100)
HCT VFR BLD AUTO: 41 % (ref 41–53)
POC ANION GAP: 12 MMOL/L (ref 7–16)
POC CREATININE: 0.9 MG/DL (ref 0.51–1.19)
POC HEMOGLOBIN (CALC): 13.9 G/DL (ref 13.5–17.5)
POC LACTIC ACID: 2.5 MMOL/L (ref 0.56–1.39)
POTASSIUM BLD-SCNC: 3.3 MMOL/L (ref 3.5–4.5)
SODIUM BLD-SCNC: 144 MMOL/L (ref 138–146)

## 2024-02-01 PROCEDURE — 6360000002 HC RX W HCPCS

## 2024-02-01 PROCEDURE — 2580000003 HC RX 258

## 2024-02-01 PROCEDURE — 82565 ASSAY OF CREATININE: CPT

## 2024-02-01 PROCEDURE — 82330 ASSAY OF CALCIUM: CPT

## 2024-02-01 PROCEDURE — 80051 ELECTROLYTE PANEL: CPT

## 2024-02-01 PROCEDURE — 82947 ASSAY GLUCOSE BLOOD QUANT: CPT

## 2024-02-01 PROCEDURE — 45380 COLONOSCOPY AND BIOPSY: CPT | Performed by: INTERNAL MEDICINE

## 2024-02-01 PROCEDURE — 45385 COLONOSCOPY W/LESION REMOVAL: CPT | Performed by: INTERNAL MEDICINE

## 2024-02-01 PROCEDURE — 7100000011 HC PHASE II RECOVERY - ADDTL 15 MIN: Performed by: INTERNAL MEDICINE

## 2024-02-01 PROCEDURE — 7100000010 HC PHASE II RECOVERY - FIRST 15 MIN: Performed by: INTERNAL MEDICINE

## 2024-02-01 PROCEDURE — 3700000001 HC ADD 15 MINUTES (ANESTHESIA): Performed by: INTERNAL MEDICINE

## 2024-02-01 PROCEDURE — 88305 TISSUE EXAM BY PATHOLOGIST: CPT

## 2024-02-01 PROCEDURE — 3609010300 HC COLONOSCOPY W/BIOPSY SINGLE/MULTIPLE: Performed by: INTERNAL MEDICINE

## 2024-02-01 PROCEDURE — 3700000000 HC ANESTHESIA ATTENDED CARE: Performed by: INTERNAL MEDICINE

## 2024-02-01 PROCEDURE — 84520 ASSAY OF UREA NITROGEN: CPT

## 2024-02-01 PROCEDURE — 2709999900 HC NON-CHARGEABLE SUPPLY: Performed by: INTERNAL MEDICINE

## 2024-02-01 PROCEDURE — 83605 ASSAY OF LACTIC ACID: CPT

## 2024-02-01 PROCEDURE — 2500000003 HC RX 250 WO HCPCS

## 2024-02-01 PROCEDURE — 85014 HEMATOCRIT: CPT

## 2024-02-01 RX ORDER — SODIUM CHLORIDE 0.9 % (FLUSH) 0.9 %
5-40 SYRINGE (ML) INJECTION EVERY 12 HOURS SCHEDULED
Status: CANCELLED | OUTPATIENT
Start: 2024-02-01

## 2024-02-01 RX ORDER — LIDOCAINE HYDROCHLORIDE 10 MG/ML
1 INJECTION, SOLUTION INFILTRATION; PERINEURAL
Status: CANCELLED | OUTPATIENT
Start: 2024-02-01 | End: 2024-02-02

## 2024-02-01 RX ORDER — SODIUM CHLORIDE, SODIUM LACTATE, POTASSIUM CHLORIDE, CALCIUM CHLORIDE 600; 310; 30; 20 MG/100ML; MG/100ML; MG/100ML; MG/100ML
INJECTION, SOLUTION INTRAVENOUS CONTINUOUS PRN
Status: DISCONTINUED | OUTPATIENT
Start: 2024-02-01 | End: 2024-02-01 | Stop reason: SDUPTHER

## 2024-02-01 RX ORDER — SODIUM CHLORIDE 9 MG/ML
INJECTION, SOLUTION INTRAVENOUS PRN
Status: CANCELLED | OUTPATIENT
Start: 2024-02-01

## 2024-02-01 RX ORDER — FENTANYL CITRATE 50 UG/ML
INJECTION, SOLUTION INTRAMUSCULAR; INTRAVENOUS PRN
Status: DISCONTINUED | OUTPATIENT
Start: 2024-02-01 | End: 2024-02-01 | Stop reason: SDUPTHER

## 2024-02-01 RX ORDER — SODIUM CHLORIDE 0.9 % (FLUSH) 0.9 %
5-40 SYRINGE (ML) INJECTION PRN
Status: CANCELLED | OUTPATIENT
Start: 2024-02-01

## 2024-02-01 RX ORDER — MIDAZOLAM HYDROCHLORIDE 2 MG/2ML
1 INJECTION, SOLUTION INTRAMUSCULAR; INTRAVENOUS EVERY 10 MIN PRN
Status: CANCELLED | OUTPATIENT
Start: 2024-02-01

## 2024-02-01 RX ORDER — LIDOCAINE HYDROCHLORIDE 10 MG/ML
INJECTION, SOLUTION EPIDURAL; INFILTRATION; INTRACAUDAL; PERINEURAL PRN
Status: DISCONTINUED | OUTPATIENT
Start: 2024-02-01 | End: 2024-02-01 | Stop reason: SDUPTHER

## 2024-02-01 RX ORDER — LISINOPRIL 20 MG/1
20 TABLET ORAL DAILY
COMMUNITY

## 2024-02-01 RX ORDER — PROPOFOL 10 MG/ML
INJECTION, EMULSION INTRAVENOUS PRN
Status: DISCONTINUED | OUTPATIENT
Start: 2024-02-01 | End: 2024-02-01 | Stop reason: SDUPTHER

## 2024-02-01 RX ORDER — ONDANSETRON 2 MG/ML
4 INJECTION INTRAMUSCULAR; INTRAVENOUS
Status: CANCELLED | OUTPATIENT
Start: 2024-02-01 | End: 2024-02-02

## 2024-02-01 RX ORDER — MIDAZOLAM HYDROCHLORIDE 1 MG/ML
INJECTION INTRAMUSCULAR; INTRAVENOUS PRN
Status: DISCONTINUED | OUTPATIENT
Start: 2024-02-01 | End: 2024-02-01 | Stop reason: SDUPTHER

## 2024-02-01 RX ADMIN — FENTANYL CITRATE 50 MCG: 50 INJECTION, SOLUTION INTRAMUSCULAR; INTRAVENOUS at 09:52

## 2024-02-01 RX ADMIN — PROPOFOL 75 MG: 10 INJECTION, EMULSION INTRAVENOUS at 09:26

## 2024-02-01 RX ADMIN — MIDAZOLAM 2 MG: 1 INJECTION INTRAMUSCULAR; INTRAVENOUS at 09:24

## 2024-02-01 RX ADMIN — PROPOFOL 50 MG: 10 INJECTION, EMULSION INTRAVENOUS at 09:27

## 2024-02-01 RX ADMIN — FENTANYL CITRATE 50 MCG: 50 INJECTION, SOLUTION INTRAMUSCULAR; INTRAVENOUS at 09:58

## 2024-02-01 RX ADMIN — PROPOFOL 100 MCG/KG/MIN: 10 INJECTION, EMULSION INTRAVENOUS at 09:29

## 2024-02-01 RX ADMIN — SODIUM CHLORIDE, POTASSIUM CHLORIDE, SODIUM LACTATE AND CALCIUM CHLORIDE: 600; 310; 30; 20 INJECTION, SOLUTION INTRAVENOUS at 09:20

## 2024-02-01 RX ADMIN — LIDOCAINE HYDROCHLORIDE 50 MG: 10 INJECTION, SOLUTION EPIDURAL; INFILTRATION; INTRACAUDAL; PERINEURAL at 09:25

## 2024-02-01 ASSESSMENT — PAIN - FUNCTIONAL ASSESSMENT
PAIN_FUNCTIONAL_ASSESSMENT: FACE, LEGS, ACTIVITY, CRY, AND CONSOLABILITY (FLACC)
PAIN_FUNCTIONAL_ASSESSMENT: 0-10

## 2024-02-01 NOTE — TELEPHONE ENCOUNTER
----- Message from Ahsan Simpson MD sent at 2/1/2024 10:03 AM EST -----  Repeat colonoscopy in 1 year with 2-day prep

## 2024-02-01 NOTE — ANESTHESIA PRE PROCEDURE
Department of Anesthesiology  Preprocedure Note       Name:  Kevin Cheney   Age:  63 y.o.  :  1960                                          MRN:  [id number]         Date:  2024      Surgeon: Surgeon(s):  Ahsan Simpson MD    Procedure: Procedure(s):  COLONOSCOPY DIAGNOSTIC    Medications prior to admission:   Prior to Admission medications    Medication Sig Start Date End Date Taking? Authorizing Provider   fluticasone (FLONASE) 50 MCG/ACT nasal spray 1 spray by Nasal route daily as needed 23  Yes Chas Almaraz MD   busPIRone (BUSPAR) 10 MG tablet Take 1 tablet by mouth 3 times daily 23  Yes Chas Almaraz MD   escitalopram (LEXAPRO) 20 MG tablet Take 1.5 tablets by mouth daily 22  Yes ProviderChas MD   bisacodyl 5 MG EC tablet Take as directed for bowel prep/colonoscopy 23   Ahsan Simpson MD   polyethylene glycol (MIRALAX) 17 GM/SCOOP powder Take per bowel prep instructions 23   Ahsan Simpson MD   atorvastatin (LIPITOR) 20 MG tablet Take 1 tablet by mouth daily    ProviderChas MD   lisinopril-hydrochlorothiazide (PRINZIDE;ZESTORETIC) 20-25 MG per tablet Take 1 tablet by mouth daily    ProviderChas MD   asenapine maleate (SAPHRIS) 5 MG SUBL sublingual tablet Place 1 tablet under the tongue 3 times daily    ProviderChas MD       Current medications:    No current facility-administered medications for this encounter.     Current Outpatient Medications   Medication Sig Dispense Refill   • fluticasone (FLONASE) 50 MCG/ACT nasal spray 1 spray by Nasal route daily as needed     • busPIRone (BUSPAR) 10 MG tablet Take 1 tablet by mouth 3 times daily     • escitalopram (LEXAPRO) 20 MG tablet Take 1.5 tablets by mouth daily     • bisacodyl 5 MG EC tablet Take as directed for bowel prep/colonoscopy 4 tablet 0   • polyethylene glycol (MIRALAX) 17 GM/SCOOP powder Take per bowel prep instructions 238 g 0   •

## 2024-02-01 NOTE — OP NOTE
Operative Note      Patient: Kevin Cheney  YOB: 1960  MRN: [id number]    Date of Procedure: 2/1/2024    Pre-Op Diagnosis Codes:     * Hx of colonic polyps [Z86.010]    Post-Op Diagnosis:  Ascending, transverse, descending colon polyps, post polypectomy scar in the sigmoid status post biopsy, sigmoid diverticulosis internal hemorrhoids.       Procedure(s):  COLONOSCOPY WITH BIOPSY    Surgeon(s):  Ahsan Simpson MD    Assistant:   * No surgical staff found *    Anesthesia: Monitor Anesthesia Care    Estimated Blood Loss (mL): Minimal    Complications: None    Specimens:   ID Type Source Tests Collected by Time Destination   A : acsending colon polyp Tissue Colon-Ascending SURGICAL PATHOLOGY Ahsan Simpson MD 2/1/2024 0940    B : transverse colon polyp Tissue Colon-Transverse SURGICAL PATHOLOGY Ahsan Simpson MD 2/1/2024 0947    C : transverse colon polyp Tissue Colon-Descending SURGICAL PATHOLOGY Ahsan Simpson MD 2/1/2024 0953    D : post polypectomy scar bx Tissue Colon SURGICAL PATHOLOGY Ahsan Simpson MD 2/1/2024 0954        Implants:  * No implants in log *      Drains: * No LDAs found *        Scope withdrawal time: 24 minutes    Description of Procedure:  Informed consent was obtained from the patient after explanation of the procedure including indications, description of the procedure,  benefits and possible risks and complications of the procedure, and alternatives. Questions were answered.  The patient's history was reviewed and a directed physical examination was performed prior to the procedure.    Patient was monitored throughout the procedure with pulse oximetry and periodic assessment of vital signs. Patient was sedated as noted above. With the patient initially in the left lateral decubitus position, a digital rectal examination was performed and revealed negative without mass, lesions or tenderness.  The Olympus video colonoscope was placed in  the patient's rectum and advanced without difficulty  to the cecum, which was identified by the ileocecal valve and appendiceal orifice.  The prep was fair.  Examination of the mucosa was performed during both introduction and withdrawal of the colonoscope. Retroflexed view of the rectum was performed.  The patient  was taken to the recovery area in good condition.     Findings:     1.  3 sessile polyps were seen in the ascending colon.  Polyps ranged in size from 5 to 6 mm.  Cold snare polypectomies were done.  Resection retrieval was complete.  2.  6 mm sessile polyp was seen in the transverse colon.  Cold snare polypectomy was done.  Resection retrieval was complete.  3.  6 mm sessile polyp was seen in the descending colon.  Cold snare polypectomy was done.  Resection retrieval was complete.  4.  Post polypectomy scar was seen in the sigmoid colon.  Prior placed tattoo was seen next to it.  Biopsies were done.  5.  Sigmoid diverticulosis.  6.  Internal hemorrhoids were seen during retroflexion view.     Recommendations: Follow-up with the biopsy results.                                     Repeat colonoscopy in 1 year with 2-day prep.                                     Follow-up with PCP.    Ahsan Simpson MD    Electronically signed by Ahsan Simpson MD on 2/1/2024 at 10:00 AM

## 2024-02-01 NOTE — H&P
History and Physical    Pt Name: Kevin Cheney  MRN: [id number]  YOB: 1960  Date of evaluation: 2/1/2024  Primary Care Physician: Vincent Dodd MD    SUBJECTIVE:   History of Chief Complaint:    Kevin Cheney is a 63 y.o. male who is scheduled today for COLONOSCOPY DIAGNOSTIC. He reports a history of colon polyps. He states his most recent colonoscopy was in July 2023 at [place]. He denies any gastrointestinal complaints.   Allergies  is allergic to asa [aspirin] and codeine.  Medications  Prior to Admission medications    Medication Sig Start Date End Date Taking? Authorizing Provider   lisinopril (PRINIVIL;ZESTRIL) 20 MG tablet Take 1 tablet by mouth daily   Yes Chas Almaraz MD   fluticasone (FLONASE) 50 MCG/ACT nasal spray 1 spray by Nasal route daily as needed 5/22/23  Yes Chas Almaraz MD   busPIRone (BUSPAR) 10 MG tablet Take 1 tablet by mouth 3 times daily 5/11/23  Yes Chas Almaraz MD   escitalopram (LEXAPRO) 20 MG tablet Take 1.5 tablets by mouth daily 11/18/22  Yes Chas Almaraz MD   bisacodyl 5 MG EC tablet Take as directed for bowel prep/colonoscopy 12/21/23   Ahsan Simpson MD   polyethylene glycol (MIRALAX) 17 GM/SCOOP powder Take per bowel prep instructions 12/21/23   Ahsan Simpson MD   atorvastatin (LIPITOR) 20 MG tablet Take 1 tablet by mouth daily    Chas Almaraz MD   lisinopril-hydrochlorothiazide (PRINZIDE;ZESTORETIC) 20-25 MG per tablet Take 1 tablet by mouth daily    Chas Almaraz MD   asenapine maleate (SAPHRIS) 5 MG SUBL sublingual tablet Place 1 tablet under the tongue 3 times daily    ProviderChas MD     Past Medical History    has a past medical history of Alcohol abuse, Arthritis, COVID-19, COVID-19 vaccine administered, Hyperlipidemia, Hypertension, Schizoaffective disorder, bipolar type (HCC), SJS-TEN overlap syndrome (HCC), Under care of team, Under care of team, Urinary frequency, Villous  adenoma of colon, and Wears dentures.  Past Surgical History   has a past surgical history that includes eye surgery (Right, 2014); vitrectomy (Right, 2014); Dental surgery; Colonoscopy; Endoscopy, colon, diagnostic; and Colonoscopy (N/A, 2023).  Social History   reports that he has been smoking cigarettes. He has a 40.0 pack-year smoking history. He has never used smokeless tobacco.   reports no history of alcohol use.   reports current drug use. Drug: Marijuana (Weed).  Marital Status   Occupation disabled  Family History  Family Status   Relation Name Status    Mother      Father      Other  (Not Specified)    Other adopted (Not Specified)     family history is not on file.  Review of Systems:  CONSTITUTIONAL:   negative for fevers, chills, fatigue and malaise    EYES:   negative for double vision, blurred vision and photophobia    HEENT:   negative for tinnitus, epistaxis and sore throat     RESPIRATORY:   negative for cough, shortness of breath, wheezing     CARDIOVASCULAR:   negative for chest pain, palpitations, syncope, edema     GASTROINTESTINAL:   negative for nausea, vomiting  +per HPI, +bowel prep completed   GENITOURINARY:   negative for incontinence     MUSCULOSKELETAL:   negative for neck or back pain     NEUROLOGICAL:   Negative for weakness and tingling  negative for headaches and dizziness     PSYCHIATRIC:   negative for anxiety       OBJECTIVE:   VITALS:  height is 1.753 m (5' 9\") and weight is 66.2 kg (146 lb). His temporal temperature is 97.2 °F (36.2 °C). His blood pressure is 157/80 (abnormal) and his pulse is 53. His respiration is 18 and oxygen saturation is 99%.   CONSTITUTIONAL:alert & oriented x 3, no acute distress. Calm and pleasant. Thin  SKIN:  Warm and dry, no rashes on exposed areas of skin   HEAD:  Normocephalic, atraumatic   EYES: PERRL.  EOMs intact.  EARS:  Equal bilaterally, no edema or thickening, skin is intact without lumps or lesions.

## 2024-02-01 NOTE — ANESTHESIA POSTPROCEDURE EVALUATION
Department of Anesthesiology  Postprocedure Note    Patient: Kevin Cheney  MRN: [id number]  YOB: 1960  Date of evaluation: 2/1/2024    Procedure Summary     Date: 02/01/24 Room / Location: [address]    Anesthesia Start: 0921 Anesthesia Stop: 1002    Procedure: COLONOSCOPY WITH BIOPSY Diagnosis:       Hx of colonic polyps      (Hx of colonic polyps [Z86.010])    Surgeons: Ahsan Simpson MD Responsible Provider: Kris Barreto MD    Anesthesia Type: MAC ASA Status: 2          Anesthesia Type: No value filed.    Say Phase I:      Say Phase II: Say Score: 10    Anesthesia Post Evaluation    Patient location during evaluation: PACU  Patient participation: complete - patient participated  Level of consciousness: awake and alert  Pain score: 0  Airway patency: patent  Nausea & Vomiting: no vomiting and no nausea  Cardiovascular status: hemodynamically stable  Respiratory status: acceptable  Pain management: adequate        No notable events documented.

## 2024-02-01 NOTE — DISCHARGE INSTRUCTIONS
No alcoholic beverages, no driving or operating machinery, no making important decisions for 24 hours.   You may have a normal diet but should eat lightly day of surgery.  Drink plenty of fluids.  Urinate within 8 hours after surgery, [facility]    POST-ENDOSCOPY INSTRUCTIONS    1. ACTIVITY   No driving, operating machinery, or making important decisions for 24 hours.    Resume normal activity after 24 hours.  You may return to work after 24 hours.    2. DIET        Resume your usual diet unless specified below.   ***    3. MEDICATIONS    Resume your usual medications.     Do not consume alcohol, tranquilizers, or sleeping medications for 24 hour unless advised by your physician.                 4. PHYSICIAN FOLLOW-UP / INSTRUCTIONS    Please call the office/clinic in 10 days for biopsy results:      [  ] GI office:  [phone]          Follow up with your family physician as planned.    6. NORMAL CHANGES YOU MAY EXPERIENCE AFTER ENDOSCOPY:          COLONOSCOPY     Passing of gas for several hours.      Some mild abdominal cramping.                   You may feel fatigued for the next 24-48   hours due to the prep and sedation    7. CALL YOUR PHYSICIAN IF YOU EXPERIENCE ANY OF THE FOLLOWING      A.  Passing blood rectally or vomiting blood (color may be red or black)      B.  Severe abdominal pain or tenderness (that is not relieved by passing air)      C.  Fever, chills, or excessive sweating      D.  Persistent nausea or vomiting      E.  Redness or swelling at the IV site    If you have additional questions, PLEASE call your doctor or the [facility] GI [facility] ([phone])

## 2024-02-03 LAB — SURGICAL PATHOLOGY REPORT: NORMAL

## 2024-02-05 ENCOUNTER — TELEPHONE (OUTPATIENT)
Dept: GASTROENTEROLOGY | Age: 64
End: 2024-02-05

## 2024-02-20 ENCOUNTER — TELEPHONE (OUTPATIENT)
Dept: GASTROENTEROLOGY | Age: 64
End: 2024-02-20

## 2025-02-20 ENCOUNTER — HOSPITAL ENCOUNTER (INPATIENT)
Age: 65
LOS: 2 days | Discharge: HOME | End: 2025-02-22
Attending: PSYCHIATRY & NEUROLOGY | Admitting: PSYCHIATRY & NEUROLOGY
Payer: COMMERCIAL

## 2025-02-20 DIAGNOSIS — R29.898 RUE WEAKNESS: ICD-10-CM

## 2025-02-20 DIAGNOSIS — I63.9 CEREBROVASCULAR ACCIDENT (CVA), UNSPECIFIED MECHANISM (HCC): Primary | ICD-10-CM

## 2025-02-20 PROBLEM — R53.1 RIGHT SIDED WEAKNESS: Status: ACTIVE | Noted: 2025-02-20

## 2025-02-20 PROBLEM — R53.1 WEAKNESS: Status: ACTIVE | Noted: 2025-02-20

## 2025-02-20 LAB
ANION GAP SERPL CALCULATED.3IONS-SCNC: 18 MMOL/L (ref 9–16)
BUN BLD-MCNC: 15 MG/DL (ref 8–26)
BUN SERPL-MCNC: 15 MG/DL (ref 8–23)
CA-I BLD-SCNC: 1.37 MMOL/L (ref 1.15–1.33)
CALCIUM SERPL-MCNC: 9.1 MG/DL (ref 8.6–10.4)
CHLORIDE BLD-SCNC: 111 MMOL/L (ref 98–107)
CHLORIDE SERPL-SCNC: 106 MMOL/L (ref 98–107)
CO2 BLD CALC-SCNC: 19 MMOL/L (ref 22–30)
CO2 SERPL-SCNC: 15 MMOL/L (ref 20–31)
CREAT SERPL-MCNC: 1.1 MG/DL (ref 0.7–1.2)
EGFR, POC: >90 ML/MIN/1.73M2
ERYTHROCYTE [DISTWIDTH] IN BLOOD BY AUTOMATED COUNT: 13 % (ref 11.8–14.4)
GFR, ESTIMATED: 74 ML/MIN/1.73M2
GLUCOSE BLD-MCNC: 130 MG/DL (ref 74–100)
GLUCOSE SERPL-MCNC: 116 MG/DL (ref 74–99)
HCO3 VENOUS: 19.5 MMOL/L (ref 22–29)
HCT VFR BLD AUTO: 44.7 % (ref 40.7–50.3)
HCT VFR BLD AUTO: 46 % (ref 41–53)
HGB BLD-MCNC: 14.7 G/DL (ref 13–17)
MCH RBC QN AUTO: 29.7 PG (ref 25.2–33.5)
MCHC RBC AUTO-ENTMCNC: 32.9 G/DL (ref 28.4–34.8)
MCV RBC AUTO: 90.3 FL (ref 82.6–102.9)
NEGATIVE BASE EXCESS, VEN: 6.8 MMOL/L (ref 0–2)
NRBC BLD-RTO: 0 PER 100 WBC
O2 SAT, VEN: 67.4 % (ref 60–85)
PCO2 VENOUS: 40.5 MM HG (ref 41–51)
PH VENOUS: 7.29 (ref 7.32–7.43)
PLATELET # BLD AUTO: 250 K/UL (ref 138–453)
PMV BLD AUTO: 10.2 FL (ref 8.1–13.5)
PO2 VENOUS: 39 MM HG (ref 30–50)
POC ANION GAP: 15 MMOL/L (ref 7–16)
POC CREATININE: 0.9 MG/DL (ref 0.51–1.19)
POC HEMOGLOBIN (CALC): 15.7 G/DL (ref 13.5–17.5)
POC LACTIC ACID: 8.2 MMOL/L (ref 0.56–1.39)
POTASSIUM BLD-SCNC: 4 MMOL/L (ref 3.5–4.5)
POTASSIUM SERPL-SCNC: 4.1 MMOL/L (ref 3.7–5.3)
RBC # BLD AUTO: 4.95 M/UL (ref 4.21–5.77)
SODIUM BLD-SCNC: 144 MMOL/L (ref 138–146)
SODIUM SERPL-SCNC: 139 MMOL/L (ref 136–145)
WBC OTHER # BLD: 11.3 K/UL (ref 3.5–11.3)

## 2025-02-20 PROCEDURE — 82565 ASSAY OF CREATININE: CPT

## 2025-02-20 PROCEDURE — 82803 BLOOD GASES ANY COMBINATION: CPT

## 2025-02-20 PROCEDURE — 84520 ASSAY OF UREA NITROGEN: CPT

## 2025-02-20 PROCEDURE — 80051 ELECTROLYTE PANEL: CPT

## 2025-02-20 PROCEDURE — 82330 ASSAY OF CALCIUM: CPT

## 2025-02-20 PROCEDURE — 2060000000 HC ICU INTERMEDIATE R&B

## 2025-02-20 PROCEDURE — 85014 HEMATOCRIT: CPT

## 2025-02-20 PROCEDURE — 83605 ASSAY OF LACTIC ACID: CPT

## 2025-02-20 PROCEDURE — 82947 ASSAY GLUCOSE BLOOD QUANT: CPT

## 2025-02-20 PROCEDURE — 85027 COMPLETE CBC AUTOMATED: CPT

## 2025-02-20 PROCEDURE — 80048 BASIC METABOLIC PNL TOTAL CA: CPT

## 2025-02-20 RX ORDER — BUSPIRONE HYDROCHLORIDE 10 MG/1
10 TABLET ORAL 3 TIMES DAILY
Status: DISCONTINUED | OUTPATIENT
Start: 2025-02-20 | End: 2025-02-22 | Stop reason: HOSPADM

## 2025-02-20 RX ORDER — SODIUM CHLORIDE 0.9 % (FLUSH) 0.9 %
5-40 SYRINGE (ML) INJECTION PRN
Status: DISCONTINUED | OUTPATIENT
Start: 2025-02-20 | End: 2025-02-22 | Stop reason: HOSPADM

## 2025-02-20 RX ORDER — SODIUM CHLORIDE 0.9 % (FLUSH) 0.9 %
5-40 SYRINGE (ML) INJECTION EVERY 12 HOURS SCHEDULED
Status: DISCONTINUED | OUTPATIENT
Start: 2025-02-20 | End: 2025-02-22 | Stop reason: HOSPADM

## 2025-02-20 RX ORDER — LISINOPRIL AND HYDROCHLOROTHIAZIDE 20; 25 MG/1; MG/1
1 TABLET ORAL DAILY
Status: DISCONTINUED | OUTPATIENT
Start: 2025-02-21 | End: 2025-02-20 | Stop reason: RX

## 2025-02-20 RX ORDER — ENOXAPARIN SODIUM 100 MG/ML
40 INJECTION SUBCUTANEOUS DAILY
Status: DISCONTINUED | OUTPATIENT
Start: 2025-02-21 | End: 2025-02-22 | Stop reason: HOSPADM

## 2025-02-20 RX ORDER — FLUTICASONE PROPIONATE 50 MCG
1 SPRAY, SUSPENSION (ML) NASAL DAILY PRN
Status: DISCONTINUED | OUTPATIENT
Start: 2025-02-20 | End: 2025-02-22 | Stop reason: HOSPADM

## 2025-02-20 RX ORDER — BISACODYL 5 MG/1
5 TABLET, DELAYED RELEASE ORAL DAILY PRN
Status: DISCONTINUED | OUTPATIENT
Start: 2025-02-20 | End: 2025-02-22 | Stop reason: HOSPADM

## 2025-02-20 RX ORDER — ONDANSETRON 2 MG/ML
4 INJECTION INTRAMUSCULAR; INTRAVENOUS EVERY 6 HOURS PRN
Status: DISCONTINUED | OUTPATIENT
Start: 2025-02-20 | End: 2025-02-22 | Stop reason: HOSPADM

## 2025-02-20 RX ORDER — ROSUVASTATIN CALCIUM 20 MG/1
20 TABLET, COATED ORAL NIGHTLY
Status: DISCONTINUED | OUTPATIENT
Start: 2025-02-20 | End: 2025-02-21

## 2025-02-20 RX ORDER — POLYETHYLENE GLYCOL 3350 17 G/17G
17 POWDER, FOR SOLUTION ORAL DAILY PRN
Status: DISCONTINUED | OUTPATIENT
Start: 2025-02-20 | End: 2025-02-20

## 2025-02-20 RX ORDER — ASENAPINE 5 MG/1
5 TABLET SUBLINGUAL 3 TIMES DAILY
Status: DISCONTINUED | OUTPATIENT
Start: 2025-02-20 | End: 2025-02-22 | Stop reason: HOSPADM

## 2025-02-20 RX ORDER — ONDANSETRON 4 MG/1
4 TABLET, ORALLY DISINTEGRATING ORAL EVERY 8 HOURS PRN
Status: DISCONTINUED | OUTPATIENT
Start: 2025-02-20 | End: 2025-02-22 | Stop reason: HOSPADM

## 2025-02-20 RX ORDER — ESCITALOPRAM OXALATE 10 MG/1
30 TABLET ORAL DAILY
Status: DISCONTINUED | OUTPATIENT
Start: 2025-02-21 | End: 2025-02-22 | Stop reason: HOSPADM

## 2025-02-20 RX ORDER — CLOPIDOGREL BISULFATE 75 MG/1
75 TABLET ORAL DAILY
Status: DISCONTINUED | OUTPATIENT
Start: 2025-02-21 | End: 2025-02-22 | Stop reason: HOSPADM

## 2025-02-20 RX ORDER — HYDROCHLOROTHIAZIDE 25 MG/1
25 TABLET ORAL DAILY
Status: DISCONTINUED | OUTPATIENT
Start: 2025-02-21 | End: 2025-02-22 | Stop reason: HOSPADM

## 2025-02-20 RX ORDER — LISINOPRIL 20 MG/1
20 TABLET ORAL DAILY
Status: DISCONTINUED | OUTPATIENT
Start: 2025-02-21 | End: 2025-02-22 | Stop reason: HOSPADM

## 2025-02-20 RX ORDER — SODIUM CHLORIDE 9 MG/ML
INJECTION, SOLUTION INTRAVENOUS PRN
Status: DISCONTINUED | OUTPATIENT
Start: 2025-02-20 | End: 2025-02-22 | Stop reason: HOSPADM

## 2025-02-21 ENCOUNTER — APPOINTMENT (OUTPATIENT)
Dept: GENERAL RADIOLOGY | Age: 65
End: 2025-02-21
Attending: PSYCHIATRY & NEUROLOGY
Payer: COMMERCIAL

## 2025-02-21 ENCOUNTER — APPOINTMENT (OUTPATIENT)
Dept: MRI IMAGING | Age: 65
End: 2025-02-21
Attending: PSYCHIATRY & NEUROLOGY
Payer: COMMERCIAL

## 2025-02-21 PROBLEM — R29.898 RUE WEAKNESS: Status: ACTIVE | Noted: 2025-02-21

## 2025-02-21 LAB
ANION GAP SERPL CALCULATED.3IONS-SCNC: 15 MMOL/L (ref 9–16)
B-OH-BUTYR SERPL-MCNC: 0.37 MMOL/L (ref 0.02–0.27)
BUN SERPL-MCNC: 28 MG/DL (ref 8–23)
CALCIUM SERPL-MCNC: 9.6 MG/DL (ref 8.6–10.4)
CHLORIDE SERPL-SCNC: 104 MMOL/L (ref 98–107)
CHOLEST SERPL-MCNC: 235 MG/DL (ref 0–199)
CHOLESTEROL/HDL RATIO: 4.8
CO2 SERPL-SCNC: 19 MMOL/L (ref 20–31)
CREAT SERPL-MCNC: 0.9 MG/DL (ref 0.7–1.2)
ERYTHROCYTE [DISTWIDTH] IN BLOOD BY AUTOMATED COUNT: 14 % (ref 11.8–14.4)
EST. AVERAGE GLUCOSE BLD GHB EST-MCNC: 105 MG/DL
FOLATE SERPL-MCNC: 15.6 NG/ML (ref 4.8–24.2)
GFR, ESTIMATED: >90 ML/MIN/1.73M2
GLUCOSE BLD-MCNC: 92 MG/DL (ref 75–110)
GLUCOSE SERPL-MCNC: 88 MG/DL (ref 74–99)
HBA1C MFR BLD: 5.3 % (ref 4–6)
HCT VFR BLD AUTO: 38 % (ref 40.7–50.3)
HDLC SERPL-MCNC: 49 MG/DL
HGB BLD-MCNC: 12.2 G/DL (ref 13–17)
LACTIC ACID, WHOLE BLOOD: 1 MMOL/L (ref 0.7–2.1)
LACTIC ACID, WHOLE BLOOD: 1.1 MMOL/L (ref 0.7–2.1)
LDLC SERPL CALC-MCNC: 174 MG/DL (ref 0–100)
MCH RBC QN AUTO: 30.2 PG (ref 25.2–33.5)
MCHC RBC AUTO-ENTMCNC: 32.1 G/DL (ref 28.4–34.8)
MCV RBC AUTO: 94.1 FL (ref 82.6–102.9)
NRBC BLD-RTO: 0 PER 100 WBC
PLATELET # BLD AUTO: 237 K/UL (ref 138–453)
PMV BLD AUTO: 9 FL (ref 8.1–13.5)
POTASSIUM SERPL-SCNC: 4 MMOL/L (ref 3.7–5.3)
RBC # BLD AUTO: 4.04 M/UL (ref 4.21–5.77)
SODIUM SERPL-SCNC: 138 MMOL/L (ref 136–145)
TRIGL SERPL-MCNC: 60 MG/DL
VIT B12 SERPL-MCNC: 428 PG/ML (ref 232–1245)
VLDLC SERPL CALC-MCNC: 12 MG/DL (ref 1–30)
WBC OTHER # BLD: 4.2 K/UL (ref 3.5–11.3)

## 2025-02-21 PROCEDURE — 6360000002 HC RX W HCPCS

## 2025-02-21 PROCEDURE — 83036 HEMOGLOBIN GLYCOSYLATED A1C: CPT

## 2025-02-21 PROCEDURE — 70551 MRI BRAIN STEM W/O DYE: CPT

## 2025-02-21 PROCEDURE — 6370000000 HC RX 637 (ALT 250 FOR IP)

## 2025-02-21 PROCEDURE — 80048 BASIC METABOLIC PNL TOTAL CA: CPT

## 2025-02-21 PROCEDURE — 2500000003 HC RX 250 WO HCPCS

## 2025-02-21 PROCEDURE — 99223 1ST HOSP IP/OBS HIGH 75: CPT | Performed by: PSYCHIATRY & NEUROLOGY

## 2025-02-21 PROCEDURE — 74018 RADEX ABDOMEN 1 VIEW: CPT

## 2025-02-21 PROCEDURE — 82010 KETONE BODYS QUAN: CPT

## 2025-02-21 PROCEDURE — 83605 ASSAY OF LACTIC ACID: CPT

## 2025-02-21 PROCEDURE — 72141 MRI NECK SPINE W/O DYE: CPT

## 2025-02-21 PROCEDURE — 36415 COLL VENOUS BLD VENIPUNCTURE: CPT

## 2025-02-21 PROCEDURE — 2060000000 HC ICU INTERMEDIATE R&B

## 2025-02-21 PROCEDURE — 92523 SPEECH SOUND LANG COMPREHEN: CPT

## 2025-02-21 PROCEDURE — 82746 ASSAY OF FOLIC ACID SERUM: CPT

## 2025-02-21 PROCEDURE — 80061 LIPID PANEL: CPT

## 2025-02-21 PROCEDURE — 71045 X-RAY EXAM CHEST 1 VIEW: CPT

## 2025-02-21 PROCEDURE — 6370000000 HC RX 637 (ALT 250 FOR IP): Performed by: PSYCHIATRY & NEUROLOGY

## 2025-02-21 PROCEDURE — 82607 VITAMIN B-12: CPT

## 2025-02-21 PROCEDURE — 82947 ASSAY GLUCOSE BLOOD QUANT: CPT

## 2025-02-21 PROCEDURE — 85027 COMPLETE CBC AUTOMATED: CPT

## 2025-02-21 RX ORDER — ROSUVASTATIN CALCIUM 20 MG/1
40 TABLET, COATED ORAL NIGHTLY
Status: DISCONTINUED | OUTPATIENT
Start: 2025-02-21 | End: 2025-02-22 | Stop reason: HOSPADM

## 2025-02-21 RX ADMIN — ASENAPINE MALEATE 5 MG: 5 TABLET SUBLINGUAL at 08:58

## 2025-02-21 RX ADMIN — ENOXAPARIN SODIUM 40 MG: 100 INJECTION SUBCUTANEOUS at 09:01

## 2025-02-21 RX ADMIN — SODIUM CHLORIDE, PRESERVATIVE FREE 10 ML: 5 INJECTION INTRAVENOUS at 01:20

## 2025-02-21 RX ADMIN — ROSUVASTATIN CALCIUM 20 MG: 20 TABLET, FILM COATED ORAL at 01:20

## 2025-02-21 RX ADMIN — CLOPIDOGREL BISULFATE 75 MG: 75 TABLET ORAL at 08:58

## 2025-02-21 RX ADMIN — BUSPIRONE HYDROCHLORIDE 10 MG: 10 TABLET ORAL at 15:36

## 2025-02-21 RX ADMIN — Medication 3 MG: at 20:55

## 2025-02-21 RX ADMIN — BUSPIRONE HYDROCHLORIDE 10 MG: 10 TABLET ORAL at 20:56

## 2025-02-21 RX ADMIN — SODIUM CHLORIDE, PRESERVATIVE FREE 10 ML: 5 INJECTION INTRAVENOUS at 08:58

## 2025-02-21 RX ADMIN — ROSUVASTATIN CALCIUM 40 MG: 20 TABLET, FILM COATED ORAL at 20:56

## 2025-02-21 RX ADMIN — BUSPIRONE HYDROCHLORIDE 10 MG: 10 TABLET ORAL at 08:58

## 2025-02-21 RX ADMIN — ASENAPINE MALEATE 5 MG: 5 TABLET SUBLINGUAL at 20:56

## 2025-02-21 RX ADMIN — ESCITALOPRAM OXALATE 30 MG: 10 TABLET ORAL at 08:58

## 2025-02-21 RX ADMIN — ASENAPINE MALEATE 5 MG: 5 TABLET SUBLINGUAL at 15:36

## 2025-02-21 RX ADMIN — SODIUM CHLORIDE, PRESERVATIVE FREE 10 ML: 5 INJECTION INTRAVENOUS at 20:56

## 2025-02-21 RX ADMIN — ASENAPINE MALEATE 5 MG: 5 TABLET SUBLINGUAL at 01:20

## 2025-02-21 RX ADMIN — BUSPIRONE HYDROCHLORIDE 10 MG: 10 TABLET ORAL at 01:20

## 2025-02-21 ASSESSMENT — LIFESTYLE VARIABLES
HOW MANY STANDARD DRINKS CONTAINING ALCOHOL DO YOU HAVE ON A TYPICAL DAY: PATIENT DOES NOT DRINK
HOW OFTEN DO YOU HAVE A DRINK CONTAINING ALCOHOL: NEVER

## 2025-02-21 NOTE — PROGRESS NOTES
Spiritual Health History and Assessment/Progress Note  [facility]    Initial Encounter,  ,  ,      Name: Kevin Cheney MRN: [id number]    Age: 64 y.o.     Sex: male   Language: English   Episcopalian: Non-Oriental orthodox   Right sided weakness     Date: 2/21/2025            Total Time Calculated: 20 min              Spiritual Assessment began in [facility]        Referral/Consult From: Nurse   Encounter Overview/Reason: Initial Encounter  Service Provided For: Patient    Shala, Belief, Meaning:   Patient has beliefs or practices that help with coping during difficult times  Family/Friends No family/friends present      Importance and Influence:  Patient has spiritual/personal beliefs that influence decisions regarding their health  Family/Friends No family/friends present    Community:  Patient feels well-supported. Support system includes: Children and Friends  Family/Friends No family/friends present    Assessment and Plan of Care:    responded to a request for spiritual and emotional support. Patient was awake and oriented when  entered his room. Family was not present at the time. Patient remained hopeful and seemed to appreciate the treatment and care he was receiving. When asked how he was feeling, patient responded, \"there is a little improvement but I know I am in good hands.\"  maintained listening presence, offered support and prayed with patient. Patient expressed gratitude for the blessing he received.      Patient Interventions include: Facilitated expression of thoughts and feelings, Explored spiritual coping/struggle/distress, and Affirmed coping skills/support systems  Family/Friends Interventions include: No family/friends present    Patient Plan of Care: Spiritual Care available upon further referral  Family/Friends Plan of Care: No family/friends present    Electronically signed by Chaplain Mamta on 2/21/2025 at 11:13 AM

## 2025-02-21 NOTE — CONSULTS
Physical Medicine & Rehabilitation  Consult Note      Admitting Physician:   Chele Syed DO    Primary Care Provider:   Vincent Dodd MD     Reason for Consult:  Acute Inpatient Rehabilitation    Chief Complaint: Admitted for stroke workup    History of Present Illness:  Referring Provider is requesting an evaluation for appropriate placement upon discharge from acute care. History from chart review and patient    Kevin Cheney is a 64 y.o. male admitted to [facility] on 2/20/2025.      64-year-old male with history of lumbar radiculopathy, hyperlipidemia, hypertension presented transfer [facility] for stroke workup.  Complaint right upper lower extremity weakness for the past week.  Transfer to [facility] notes aspirin gives him hives started on Plavix CTA showed stenosis left and right ICA proximal 70%, left vertebral artery stenosis.  CT head chronic white matter changes small hypodensities to left side of ayde likely remote infarct.  He notes shocklike sensation neck with radiation down the right upper and lower extremity    Notes came in with right upper lower extremity weakness per patient however now notes weakness all over    Review of Systems:  Constitutional: negative for anorexia, chills, fatigue, fevers, sweats and weight loss  Eyes: negative for redness and visual disturbance  Ears, nose, mouth, throat, and face: negative for earaches, sore throat and tinnitus  Respiratory: negative for cough and shortness of breath  Cardiovascular: negative for chest pain, dyspnea and palpitations  Gastrointestinal: negative for abdominal pain, change in bowel habits, constipation, nausea and vomiting  Genitourinary:negative for dysuria, frequency, hesitancy and urinary incontinence  Integument/breast: negative for pruritus and rash  Musculoskeletal:negative for stiff joints  Neurological: negative for dizziness, headaches   Behavioral/Psych: negative for decreased appetite, depression        Premorbid  Number of Times Moved in the Last Year: 0     Homeless in the Last Year: No         Family History:   No family history on file.    Diagnostics:    CBC:   Recent Labs     02/20/25 2328 02/21/25  0739   WBC 11.3 4.2   RBC 4.95 4.04*   HGB 14.7 12.2*   HCT 44.7 38.0*   MCV 90.3 94.1   RDW 13.0 14.0    237     BMP:    Recent Labs     02/20/25 2328 02/20/25 2335 02/21/25  0739   GLUCOSE 116*  --  88   BUN 15  --  28*   CREATININE 1.1 0.9 0.9   CALCIUM 9.1  --  9.6     --  138   K 4.1  --  4.0     --  104   CO2 15*  --  19*   ANIONGAP 18*  --  15   LABGLOM 74  --  >90     HbA1c:   Lab Results   Component Value Date    LABA1C 5.3 02/21/2025     BNP: No results for input(s): \"BNP\" in the last 72 hours.  PT/INR: No results for input(s): \"PROTIME\", \"INR\" in the last 72 hours.  APTT: No results for input(s): \"APTT\" in the last 72 hours.  CARDIAC ENZYMES: No results for input(s): \"CKMB\", \"CKMBINDEX\", \"TROPONINT\", \"TROPHS\", \"TROPII\" in the last 72 hours.    Invalid input(s): \"CKTOTAL;3\"   FASTING LIPID PANEL:  Lab Results   Component Value Date    CHOL 235 (H) 02/21/2025    HDL 49 02/21/2025    TRIG 60 02/21/2025     LIVER PROFILE: No results for input(s): \"AST\", \"ALT\", \"BILIDIR\", \"BILITOT\", \"ALKPHOS\" in the last 72 hours.    Invalid input(s): \"ALB\"     Radiology:        MRI CERVICAL SPINE WO CONTRAST    Result Date: 2/21/2025  Multilevel degenerative change with foraminal narrowing as described above.     MRI brain without contrast    Result Date: 2/21/2025  Chronic microvascular disease without acute intracranial abnormality.     XR ABDOMEN (KUB) (SINGLE AP VIEW)    Result Date: 2/21/2025  Nonobstructive bowel gas pattern.     XR CHEST PORTABLE    Result Date: 2/21/2025  No acute cardiopulmonary process.        Physical Exam:  BP (!) 165/88   Pulse 54   Temp 97.8 °F (36.6 °C) (Oral)   Resp 20   SpO2 99%     GEN: Well developed, well nourished, no acute distress.  HEENT: NCAT, PERRL, EOMI, mucous

## 2025-02-21 NOTE — H&P
[facility] Neurology   IN-PATIENT SERVICE   [facility]    HISTORY AND PHYSICAL EXAMINATION            Date:   2/20/2025  Patient name:  Kevin Cheney  Date of admission:  2/20/2025 10:48 PM  MRN:   [id number]  Account:  [id number]  YOB: 1960  PCP:    Vincent Dodd MD  Room:   [address] [place] [address]  Code Status:    Prior    Chief Complaint:     No chief complaint on file.      History Obtained From:     EMR    History of Present Illness:     The patient is a 64 y.o.   /  male with history of lumbar radiculopathy, hyperlipidemia, hypertension who presents as a transfer from [facility] for a stroke workup.  He initially presented to [facility] 2/19/2025 complaining of right upper and lower extremity weakness for the past week.  Was transferred to [facility] for stroke workup including MRI brain.  His initial NIH was calculated at 2 per telestroke eval.  Not on any blood thinners at home.  Reported allergic to aspirin with hives so started on Plavix.  Intake mentions \"load with 75 of Plavix.\"  CTA showing stenosis of left and right ICA stenosis approximately 70% bilaterally, left vertebral artery stenosis moderate.  CT head reporting chronic white matter changes, small hypodensity left side ayde likely remote infarct.  Images not available in PACS, will call radiology.    He reports a shocklike sensation in his neck which radiates down the right upper and lower extremity and states his weakness is secondary to this pain as he feels he is unable to bear weight due to the uncomfortable sensation.  When asked to flex his neck forward, he states there is some pain but the shocklike sensation is not elicited.  The patient states his symptoms have lasted over 7 days and are intermittent but can occasionally last for hours throughout the day.  He reports neck pain and stiffness bilaterally with some mild tenderness to palpation in the midline as well as tenderness in     Normal facial sensation   VII    -     Normal facial symmetry   MOTOR FUNCTION: No drift in any extremity   SENSORY FUNCTION: Denies new changes in sensation      CEREBELLAR FUNCTION: Not assessed   REFLEX FUNCTION: Not assessed   STATION and GAIT Deferred to PT         Investigations:      Laboratory Testing:  No results found for this or any previous visit (from the past 24 hour(s)).      Assessment :      Primary Problem  Right sided weakness    Active Hospital Problems    Diagnosis Date Noted    Right sided weakness [R53.1] 02/20/2025     The patient is a 64 y.o.   /  male with history of lumbar radiculopathy, hyperlipidemia, hypertension who presents as a transfer from [facility] for a stroke workup.  He initially presented to [facility] 2/19/2025 complaining of right upper and lower extremity weakness for the past week.  Was transferred to [facility] for stroke workup including MRI brain.  His initial NIH was calculated at 2 per telestroke eval.  Not on any blood thinners at home.  Reported allergic to aspirin with hives so started on Plavix.  Intake mentions \"load with 75 of Plavix.\"  CTA showing stenosis of left and right ICA stenosis approximately 70% bilaterally, left vertebral artery stenosis moderate.  CT head reporting chronic white matter changes, small hypodensity left side ayde likely remote infarct.       Plan:       RU&LE paresthesias, etiology under investigation   - MRI brain  - MRI C-spine  - Infectious/metabolic workup, not on any medications to explain lactic acidosis  - Neuro checks per protocol      BERENICE  Bipolar disorder  - Continue buspirone 10 mg tid  - Continue Saphris tid  - Continue Lexapro 30 mg daily      Consultations:   None    Katelyn Watson MD  Neurology Resident   2/20/2025  11:22 PM    Copy sent to Vincent Dodd MD

## 2025-02-21 NOTE — PLAN OF CARE
Problem: Discharge Planning  Goal: Discharge to home or other facility with appropriate resources  Outcome: Progressing     Problem: Safety - Adult  Goal: Free from fall injury  Outcome: Progressing     Problem: Spiritual Struggle  Goal: Verbalizes spiritual struggle  Outcome: Progressing     Problem: Emotional Distress  Goal: Verbalization of thoughts and feelings  Outcome: Progressing

## 2025-02-21 NOTE — CARE COORDINATION
SW met with patient to assess support and complete PHQ-9 screen.  Patient lives with his son who is supportive.  Patient indicated he prepares his own meals, no concerns obtaining food.  According to patient he sometimes feels depressed, often feels tired and sometimes has a poor appetite.  Patient stated he goes to [facility] where he sees a counselor monthly, he also sees a psychiatrist regularly.  Patient denied suicidal or homicidal ideations.  Patient plans to return home with his son at discharge and continue seeing his counselor at [facility].    Patient Health Questionnaire-9 (PHQ-9)    Over the last 2 weeks, how often have you been bothered by any of the following problems?    1. Little Interest or pleasure in doing things?   [x] Not at all  [] Several Days  [] More than half the day  []  Nearly every day    2. Feeling down, depressed or hopeless?    [] Not at all  [x] Several Days  [] More than half the day  []  Nearly every day    3. Trouble falling or staying asleep, or sleeping too much?   [x] Not at all  [] Several Days  [] More than half the day  []  Nearly every day    4. Feeling tired or having little energy?   [] Not at all  [] Several Days  [] More than half the day  [x]  Nearly every day    5. Poor apettite or overeating?   [] Not at all  [x] Several Days  [] More than half the day  []  Nearly every day    6. Feeling bad about yourself-or that you are a failure or have let yourself or your family down?   [] Not at all  [x] Several Days  [] More than half the day  []  Nearly every day    7. Trouble concentrating on things, such as reading the newspaper or watching television?   [x] Not at all  [] Several Days  [] More than half the day  []  Nearly every day    8. Moving or speaking so slowly that other people could have noticed? Or the opposite-being so fidgety or restless that you have been moving around a lot more than usual?   [x] Not at all  [] Several Days  [] More

## 2025-02-21 NOTE — PLAN OF CARE
Problem: Safety - Adult  Goal: Free from fall injury  Outcome: Progressing     Problem: Spiritual Struggle  Goal: Verbalizes spiritual struggle  Outcome: Progressing

## 2025-02-21 NOTE — CARE COORDINATION
Case Management Assessment  Initial Evaluation    Date/Time of Evaluation: 2/21/2025 11:39 AM  Assessment Completed by: Ren Mendes    If patient is discharged prior to next notation, then this note serves as note for discharge by case management.    Patient Name: Kevin Cheney                   YOB: 1960  Diagnosis: Right sided weakness [R53.1]  Weakness [R53.1]                   Date / Time: 2/20/2025 10:48 PM    Patient Admission Status: Inpatient   Readmission Risk (Low < 19, Mod (19-27), High > 27): Readmission Risk Score: 7.9    Current PCP: Vincent Dodd MD  PCP verified by CM? (P) Yes    Chart Reviewed: Yes      History Provided by: (P) Patient  Patient Orientation: (P) Alert and Oriented    Patient Cognition: (P) Alert    Hospitalization in the last 30 days (Readmission):  No    If yes, Readmission Assessment in CM Navigator will be completed.    Advance Directives:      Code Status: Full Code   Patient's Primary Decision Maker is: (P) Legal Next of Kin      Discharge Planning:    Patient lives with: (P) Children Type of Home: (P) Apartment  Primary Care Giver: (P) Self  Patient Support Systems include: (P) Friends/Neighbors   Current Financial resources: (P) Medicare  Current community resources: (P) None  Current services prior to admission: (P) None            Current DME:              Type of Home Care services:  (P) None    ADLS  Prior functional level: (P) Independent in ADLs/IADLs  Current functional level: (P) Independent in ADLs/IADLs    PT AM-PAC:   /24  OT AM-PAC:   /24    Family can provide assistance at DC: (P) No  Would you like Case Management to discuss the discharge plan with any other family members/significant others, and if so, who? (P) No  Plans to Return to Present Housing: (P) Yes  Other Identified Issues/Barriers to RETURNING to current housing: limited support system  Potential Assistance needed at discharge: (P) N/A            Potential DME:    Patient  expects to discharge to: (P) Acute rehab  Plan for transportation at discharge: (P) Cab    Financial    Payor: [employer]EFITS / Plan: [employer] DUAL / Product Type: *No Product type* /     Does insurance require precert for SNF: Yes    Potential assistance Purchasing Medications: (P) No  Meds-to-Beds request: Yes      [facility] - [place], [place] - [address] - P [phone] - F [phone]  [address]  [facility] [address]  Phone: [phone] Fax: [phone]    [facility] [employer] - [place], [place] - [address] - P [phone] - F [phone]  [address] [facility] [address]  [facility] [address]  Phone: [phone] Fax: [phone]    [facility] - [place], [place] - [place] - [address] - P [phone] - F [phone]  [address]  [place] [address]  Phone: [phone] Fax: [phone]      Notes:    Factors facilitating achievement of predicted outcomes: Motivated, Cooperative, and Pleasant    Barriers to discharge: Lower extremity weakness, Medical complications, and Medication managment    Additional Case Management Notes: Pt. States he lives in an apartment with his son. He is independent with ADLs. He currently doesn't use any DME devices. Plan is to return home. Discussion to go to rehab to improve strength occurred. Pt. Was agreeable and requested a referral be sent to [facility].     The Plan for Transition of Care is related to the following treatment goals of Right sided weakness [R53.1]  Weakness [R53.1]    IF APPLICABLE: The Patient and/or patient representative Kevin and his family were provided with a choice of provider and agrees with the discharge plan. Freedom of choice list with basic dialogue that supports the patient's individualized plan of care/goals and shares the quality data associated with the providers was provided to: (P) Patient   Patient Representative Name:       The Patient and/or Patient

## 2025-02-21 NOTE — PROGRESS NOTES
Facility/Department: [address] NEURO  Initial Speech/Language/Cognitive Assessment    NAME: Kevin Cheney  : 1960   MRN: [id number]  ADMISSION DATE: 2025  ADMITTING DIAGNOSIS: has Lumbar radiculopathy; Chronic bilateral low back pain with bilateral sciatica; SJS-TEN overlap syndrome; Hypertension; Hyperlipidemia; Schizoaffective disorder, bipolar type (HCC); [place]; Photosensitivity dermatitis; Right sided weakness; and Weakness on their problem list.    Date of Eval: 2025   Evaluating Therapist: Donna Davidson      Primary Complaint:   The patient is a 64 y.o.   /  male with history of lumbar radiculopathy, hyperlipidemia, hypertension who presents as a transfer from [facility] for a stroke workup.  He initially presented to [facility] 2025 complaining of right upper and lower extremity weakness for the past week.  Was transferred to [facility] for stroke workup including MRI brain.  His initial NIH was calculated at 2 per telestroke eval.  Not on any blood thinners at home.  Reported allergic to aspirin with hives so started on Plavix.  Intake mentions \"load with 75 of Plavix.\"  CTA showing stenosis of left and right ICA stenosis approximately 70% bilaterally, left vertebral artery stenosis moderate.  CT head reporting chronic white matter changes, small hypodensity left side ayde likely remote infarct.  Images not available in PACS, will call radiology.     He reports a shocklike sensation in his neck which radiates down the right upper and lower extremity and states his weakness is secondary to this pain as he feels he is unable to bear weight due to the uncomfortable sensation.  When asked to flex his neck forward, he states there is some pain but the shocklike sensation is not elicited.  The patient states his symptoms have lasted over 7 days and are intermittent but can occasionally last for hours throughout the day.  He reports neck pain and stiffness bilaterally with

## 2025-02-21 NOTE — PLAN OF CARE
Patient seen and examined at bedside  Neuroexam: STACEY RLE 4/5, mild Brisk reflex Rt knee    Plan:  Continue Plavix 75 Mg and Crestor 20 Mg for now  MRI brain and cervical spine pending  PT OT  Regular neurovascular checks    Electronically signed by Ron Zaldivar MD on 2/21/2025 at 12:40 PM

## 2025-02-22 VITALS
HEART RATE: 60 BPM | SYSTOLIC BLOOD PRESSURE: 163 MMHG | TEMPERATURE: 97.9 F | OXYGEN SATURATION: 97 % | DIASTOLIC BLOOD PRESSURE: 82 MMHG | RESPIRATION RATE: 23 BRPM

## 2025-02-22 LAB
ANION GAP SERPL CALCULATED.3IONS-SCNC: 12 MMOL/L (ref 9–16)
BACTERIA URNS QL MICRO: ABNORMAL
BILIRUB UR QL STRIP: NEGATIVE
BUN SERPL-MCNC: 38 MG/DL (ref 8–23)
CALCIUM SERPL-MCNC: 10.4 MG/DL (ref 8.6–10.4)
CASTS #/AREA URNS LPF: ABNORMAL /LPF (ref 0–2)
CASTS #/AREA URNS LPF: ABNORMAL /LPF (ref 0–2)
CHLORIDE SERPL-SCNC: 105 MMOL/L (ref 98–107)
CLARITY UR: ABNORMAL
CO2 SERPL-SCNC: 27 MMOL/L (ref 20–31)
COLOR UR: YELLOW
CREAT SERPL-MCNC: 1.1 MG/DL (ref 0.7–1.2)
EPI CELLS #/AREA URNS HPF: ABNORMAL /HPF (ref 0–5)
ERYTHROCYTE [DISTWIDTH] IN BLOOD BY AUTOMATED COUNT: 14 % (ref 11.8–14.4)
GFR, ESTIMATED: 74 ML/MIN/1.73M2
GLUCOSE SERPL-MCNC: 69 MG/DL (ref 74–99)
GLUCOSE UR STRIP-MCNC: NEGATIVE MG/DL
HCT VFR BLD AUTO: 42.2 % (ref 40.7–50.3)
HGB BLD-MCNC: 13.7 G/DL (ref 13–17)
HGB UR QL STRIP.AUTO: NEGATIVE
KETONES UR STRIP-MCNC: ABNORMAL MG/DL
LEUKOCYTE ESTERASE UR QL STRIP: NEGATIVE
MCH RBC QN AUTO: 30.2 PG (ref 25.2–33.5)
MCHC RBC AUTO-ENTMCNC: 32.5 G/DL (ref 28.4–34.8)
MCV RBC AUTO: 93.2 FL (ref 82.6–102.9)
MUCOUS THREADS URNS QL MICRO: ABNORMAL
NITRITE UR QL STRIP: NEGATIVE
NRBC BLD-RTO: 0 PER 100 WBC
PH UR STRIP: 5.5 [PH] (ref 5–8)
PLATELET # BLD AUTO: 266 K/UL (ref 138–453)
PMV BLD AUTO: 8.9 FL (ref 8.1–13.5)
POTASSIUM SERPL-SCNC: 4.6 MMOL/L (ref 3.7–5.3)
PROT UR STRIP-MCNC: ABNORMAL MG/DL
RBC # BLD AUTO: 4.53 M/UL (ref 4.21–5.77)
RBC #/AREA URNS HPF: ABNORMAL /HPF (ref 0–2)
SODIUM SERPL-SCNC: 144 MMOL/L (ref 136–145)
SP GR UR STRIP: 1.03 (ref 1–1.03)
UROBILINOGEN UR STRIP-ACNC: NORMAL EU/DL (ref 0–1)
WBC #/AREA URNS HPF: ABNORMAL /HPF (ref 0–5)
WBC OTHER # BLD: 5.9 K/UL (ref 3.5–11.3)
YEAST URNS QL MICRO: ABNORMAL

## 2025-02-22 PROCEDURE — 99239 HOSP IP/OBS DSCHRG MGMT >30: CPT | Performed by: PSYCHIATRY & NEUROLOGY

## 2025-02-22 PROCEDURE — 6370000000 HC RX 637 (ALT 250 FOR IP)

## 2025-02-22 PROCEDURE — 80048 BASIC METABOLIC PNL TOTAL CA: CPT

## 2025-02-22 PROCEDURE — 81001 URINALYSIS AUTO W/SCOPE: CPT

## 2025-02-22 PROCEDURE — 6360000002 HC RX W HCPCS

## 2025-02-22 PROCEDURE — 6370000000 HC RX 637 (ALT 250 FOR IP): Performed by: PSYCHIATRY & NEUROLOGY

## 2025-02-22 PROCEDURE — 85027 COMPLETE CBC AUTOMATED: CPT

## 2025-02-22 PROCEDURE — 36415 COLL VENOUS BLD VENIPUNCTURE: CPT

## 2025-02-22 RX ORDER — LABETALOL HYDROCHLORIDE 5 MG/ML
10 INJECTION, SOLUTION INTRAVENOUS EVERY 4 HOURS PRN
Status: DISCONTINUED | OUTPATIENT
Start: 2025-02-22 | End: 2025-02-22 | Stop reason: HOSPADM

## 2025-02-22 RX ORDER — ROSUVASTATIN CALCIUM 40 MG/1
40 TABLET, COATED ORAL NIGHTLY
Qty: 30 TABLET | Refills: 3 | Status: CANCELLED | OUTPATIENT
Start: 2025-02-22

## 2025-02-22 RX ORDER — ATORVASTATIN CALCIUM 40 MG/1
40 TABLET, FILM COATED ORAL DAILY
Qty: 60 TABLET | Refills: 2 | Status: SHIPPED | OUTPATIENT
Start: 2025-02-22

## 2025-02-22 RX ORDER — CLOPIDOGREL BISULFATE 75 MG/1
75 TABLET ORAL DAILY
Qty: 30 TABLET | Refills: 3 | Status: SHIPPED | OUTPATIENT
Start: 2025-02-23

## 2025-02-22 RX ORDER — HYDRALAZINE HYDROCHLORIDE 20 MG/ML
10 INJECTION INTRAMUSCULAR; INTRAVENOUS EVERY 6 HOURS PRN
Status: DISCONTINUED | OUTPATIENT
Start: 2025-02-22 | End: 2025-02-22 | Stop reason: HOSPADM

## 2025-02-22 RX ADMIN — ESCITALOPRAM OXALATE 30 MG: 10 TABLET ORAL at 07:52

## 2025-02-22 RX ADMIN — HYDROCHLOROTHIAZIDE 25 MG: 25 TABLET ORAL at 07:52

## 2025-02-22 RX ADMIN — ENOXAPARIN SODIUM 40 MG: 100 INJECTION SUBCUTANEOUS at 07:53

## 2025-02-22 RX ADMIN — BUSPIRONE HYDROCHLORIDE 10 MG: 10 TABLET ORAL at 07:52

## 2025-02-22 RX ADMIN — LISINOPRIL 20 MG: 20 TABLET ORAL at 07:52

## 2025-02-22 RX ADMIN — CLOPIDOGREL BISULFATE 75 MG: 75 TABLET ORAL at 07:52

## 2025-02-22 NOTE — PROGRESS NOTES
[facility] Neurology   IN-PATIENT SERVICE   [facility]    Progress Note             Date:   2/22/2025  Patient name:  Kevin Cheney  Date of admission:  2/20/2025 10:48 PM  MRN:   [id number]  Account:  [id number]  YOB: 1960  PCP:    Vincent Dodd MD  Room:   [address] [place] [address]  Code Status:    Full Code    Chief Complaint:     Right-sided weakness    Interval hx:     The patient was seen and examined at bedside.   Is vitally stable, alert and oriented x 3. No acute events overnight.  Right-sided weakness improving, was able to walk around the hallway.  MRI brain and C-spine unremarkable  DC today      Brief History of Present Illness:     64 y.o.   /  male with history of lumbar radiculopathy, hyperlipidemia, hypertension who presents as a transfer from [facility] for a stroke workup.  He initially presented to [facility] 2/19/2025 complaining of right upper and lower extremity weakness for the past week.  Was transferred to [facility] for stroke workup including MRI brain.  His initial NIH was calculated at 2 per telestroke eval.  Not on any blood thinners at home.  Reported allergic to aspirin with hives so started on Plavix.  Intake mentions \"load with 75 of Plavix.\"  CTA showing stenosis of left and right ICA stenosis approximately 70% bilaterally, left vertebral artery stenosis moderate.  CT head reporting chronic white matter changes, small hypodensity left side ayde likely remote infarct.  Images not available in PACS, will call radiology.     He reports a shocklike sensation in his neck which radiates down the right upper and lower extremity and states his weakness is secondary to this pain as he feels he is unable to bear weight due to the uncomfortable sensation.  When asked to flex his neck forward, he states there is some pain but the shocklike sensation is not elicited.  The patient states his symptoms have lasted over 7 days and are intermittent

## 2025-02-22 NOTE — CARE COORDINATION
[employer]  Agency Booking    Confirmation #:  [id number]  Customer:  Shravan Brown  Phone Number:  [phone]  Voucher Number:  [id number]        Trip Date/Time:  2/22/2025 2:15:00 PM        Pickup Address:  [address] [place] [address] [Main entrance, [facility]]  Drop Off Address::  [address] [place] [address]        Misc. Ride Notes:  Please  at main entrance of [facility]  Return Ride Ordered:  Ordered By:  Case Management

## 2025-02-22 NOTE — PLAN OF CARE
Problem: Discharge Planning  Goal: Discharge to home or other facility with appropriate resources  Outcome: Progressing     Problem: Safety - Adult  Goal: Free from fall injury  Outcome: Progressing     Problem: Spiritual Struggle  Goal: Verbalizes spiritual struggle  Outcome: Progressing  Goal: Gains new understanding/perception  Outcome: Progressing  Goal: Growing sense of peace/hope  Outcome: Progressing  Goal: Explore resources for additional follow up, post discharge  Outcome: Progressing     Problem: Emotional Distress  Goal: Verbalization of thoughts and feelings  Outcome: Progressing  Goal: Reduce evidence of anxiety/worry/anger  Outcome: Progressing  Goal: Identifies/restores coping resources/skills  Outcome: Progressing  Goal: Growing sense of peace/hope  Outcome: Progressing  Goal: Explore resources for additional follow up, post discharge  Outcome: Progressing     Problem: Life Adjustment  Goal: Verbalization of feelings regarding change/loss  Outcome: Progressing  Goal: Finding meaning/purpose in the midst of illness/suffering  Outcome: Progressing  Goal: Identifies/restores coping resources/skills  Outcome: Progressing  Goal: Growing sense of peace/hope  Outcome: Progressing  Goal: Explore resources for additional follow up, post discharge  Outcome: Progressing     Problem: Support with Decision-Making  Goal: Verbalization of thoughts/feelings regarding decision  Outcome: Progressing  Goal: Movement towards resolution of decision  Outcome: Progressing  Goal: Identifies/restores coping resources/skills  Outcome: Progressing  Goal: Explore resources for additional follow up, post discharge  Outcome: Progressing     Problem: Unresolved Conflict/Relationships  Goal: Verbalization of thoughts and feelings  Outcome: Progressing  Goal: Movement towards resolution of conflict/strained relationship  Outcome: Progressing  Goal: Explore resources for additional follow up, post discharge  Outcome: Progressing

## 2025-02-22 NOTE — PROGRESS NOTES
[facility]     Department of Neurology    INPATIENT DISCHARGE SUMMARY        Patient Identification:  Kevin Cheney is a 64 y.o. male.  :  1960  MRN: [id number]     Acct: [id number]   Admit Date:  2025  Discharge date and time: 2025  1:58 PM   Attending Provider: Dr. Syed                                Admission Diagnoses:   Right sided weakness [R53.1]  Weakness [R53.1]    Discharge Diagnoses:   Principal Problem:    Right sided weakness  Active Problems:    Weakness    RUE weakness  Resolved Problems:    * No resolved hospital problems. *       Consults:   none    Brief Inpatient course:    64 y.o.  /  male with history of lumbar radiculopathy, hyperlipidemia, hypertension who presents as a transfer from [facility] for a stroke workup. He initially presented to [facility] 2025 complaining of right upper and lower extremity weakness for the past week. Was transferred to [facility] for stroke workup including MRI brain. His initial NIH was calculated at 2 per telestroke eval. Not on any blood thinners at home. Reported allergic to aspirin with hives so started on Plavix. Intake mentions \"load with 75 of Plavix.\" CTA showing stenosis of left and right ICA stenosis approximately 70% bilaterally, left vertebral artery stenosis moderate. CT head reporting chronic white matter changes, small hypodensity left side ayde likely remote infarct.     Patient was admitted to hospital and underwent MRI brain and cervical spine which were unremarkable.  Patient's symptoms improved postadmission and was able to walk to the hallway.  Plavix and statin was continued at discharge due to CTA findings of iCAD.  Patient needs to follow-up outpatient with neurology.      Patient is stable from neurological standpoint to be discharged.    Procedures:  none    Any Hospital Acquired Infections: none    Discharge Functional Status:  stable    Disposition: home    Patient

## 2025-02-23 NOTE — DISCHARGE SUMMARY
[facility]     Department of Neurology    INPATIENT DISCHARGE SUMMARY        Patient Identification:  Kevin Cheney is a 64 y.o. male.  :  1960  MRN: [id number]     Acct: [id number]   Admit Date:  2025  Discharge date and time: 2025  1:58 PM   Attending Provider: Dr. Syed                                Admission Diagnoses:   Right sided weakness [R53.1]  Weakness [R53.1]    Discharge Diagnoses:   Principal Problem:    Right sided weakness  Active Problems:    Weakness    RUE weakness  Resolved Problems:    * No resolved hospital problems. *       Consults:   none    Brief Inpatient course:    64 y.o.  /  male with history of lumbar radiculopathy, hyperlipidemia, hypertension who presents as a transfer from [facility] for a stroke workup. He initially presented to [facility] 2025 complaining of right upper and lower extremity weakness for the past week. Was transferred to [facility] for stroke workup including MRI brain. His initial NIH was calculated at 2 per telestroke eval. Not on any blood thinners at home. Reported allergic to aspirin with hives so started on Plavix. Intake mentions \"load with 75 of Plavix.\" CTA showing stenosis of left and right ICA stenosis approximately 70% bilaterally, left vertebral artery stenosis moderate. CT head reporting chronic white matter changes, small hypodensity left side ayde likely remote infarct.     Patient was admitted to hospital and underwent MRI brain and cervical spine which were unremarkable.  Patient's symptoms improved postadmission and was able to walk to the hallway.  Plavix and statin was continued at discharge due to CTA findings of iCAD.  Patient needs to follow-up outpatient with neurology.      Patient is stable from neurological standpoint to be discharged.    Procedures:  none    Any Hospital Acquired Infections: none    Discharge Functional Status:  stable    Disposition: home    Patient

## (undated) DEVICE — BASIN EMSIS 700ML GRAPHITE PLAS KID SHP GRAD

## (undated) DEVICE — YANKAUER,FLEXIBLE HANDLE,REGLR CAPACITY: Brand: MEDLINE INDUSTRIES, INC.

## (undated) DEVICE — DISPOSABLE DISTAL ATTACHMENT: Brand: DISPOSABLE DISTAL ATTACHMENT

## (undated) DEVICE — JELLY,LUBE,STERILE,FLIP TOP,TUBE,2-OZ: Brand: MEDLINE

## (undated) DEVICE — CLIP LIG L235CM RESOL 360 BX/20

## (undated) DEVICE — TRAP SURG QUAD PARABOLA SLOT DSGN SFTY SCRN TRAPEASE

## (undated) DEVICE — SNARE ENDOSCP M L240CM LOOP W27MM SHTH DIA2.4MM OVL FLX

## (undated) DEVICE — SINGLE-USE BIOPSY FORCEPS: Brand: RADIAL JAW 4

## (undated) DEVICE — GAUZE,SPONGE,4"X4",16PLY,STRL,LF,10/TRAY: Brand: MEDLINE

## (undated) DEVICE — CO2 CANNULA,SUPERSOFT, ADLT,7'O2,7'CO2: Brand: MEDLINE

## (undated) DEVICE — NEEDLE 25GAX5.0MM INJ CARR LOCKE

## (undated) DEVICE — MEDICINE CUP, GRADUATED, STER: Brand: MEDLINE

## (undated) DEVICE — SYRINGE MED 50ML LUERLOCK TIP

## (undated) DEVICE — SNARE ENDOSCP L240CM W15MM SHTH DIA2.4MM CHN 2.8MM STIFF

## (undated) DEVICE — SINGLE-USE POLYPECTOMY SNARE: Brand: CAPTIFLEX

## (undated) DEVICE — GOWN POLY REINF SONT XLG: Brand: MEDLINE INDUSTRIES, INC.

## (undated) DEVICE — CUP MED 1OZ CLR POLYPR FEED GRAD W/O LID

## (undated) DEVICE — TUBING, SUCTION, 1/4" X 12', STRAIGHT: Brand: MEDLINE

## (undated) DEVICE — TRAP SPEC RETRV CLR PLAS POLYP IN LN SUCT QUIK CTCH

## (undated) DEVICE — ADAPTER TBNG LUER STUB 15 GA INTMED